# Patient Record
Sex: MALE | Race: BLACK OR AFRICAN AMERICAN | Employment: OTHER | URBAN - METROPOLITAN AREA
[De-identification: names, ages, dates, MRNs, and addresses within clinical notes are randomized per-mention and may not be internally consistent; named-entity substitution may affect disease eponyms.]

---

## 2017-09-03 ENCOUNTER — HOSPITAL ENCOUNTER (INPATIENT)
Age: 72
LOS: 4 days | Discharge: LEFT BEFORE DISCHARGE | DRG: 191 | End: 2017-09-08
Attending: EMERGENCY MEDICINE | Admitting: INTERNAL MEDICINE
Payer: MEDICARE

## 2017-09-03 ENCOUNTER — APPOINTMENT (OUTPATIENT)
Dept: GENERAL RADIOLOGY | Age: 72
DRG: 191 | End: 2017-09-03
Attending: EMERGENCY MEDICINE
Payer: MEDICARE

## 2017-09-03 DIAGNOSIS — Z72.0 TOBACCO ABUSE: ICD-10-CM

## 2017-09-03 DIAGNOSIS — R91.8 LUNG INFILTRATE: ICD-10-CM

## 2017-09-03 DIAGNOSIS — J44.1 ACUTE EXACERBATION OF CHRONIC OBSTRUCTIVE PULMONARY DISEASE (COPD) (HCC): Primary | ICD-10-CM

## 2017-09-03 LAB
ALBUMIN SERPL-MCNC: 3.3 G/DL (ref 3.4–5)
ALBUMIN/GLOB SERPL: 0.7 {RATIO} (ref 0.8–1.7)
ALP SERPL-CCNC: 125 U/L (ref 45–117)
ALT SERPL-CCNC: 28 U/L (ref 16–61)
ANION GAP SERPL CALC-SCNC: 6 MMOL/L (ref 3–18)
APTT PPP: 29.3 SEC (ref 23–36.4)
AST SERPL-CCNC: 27 U/L (ref 15–37)
BASOPHILS # BLD: 0 K/UL (ref 0–0.1)
BASOPHILS NFR BLD: 1 % (ref 0–2)
BILIRUB SERPL-MCNC: 0.5 MG/DL (ref 0.2–1)
BNP SERPL-MCNC: 158 PG/ML (ref 0–900)
BUN SERPL-MCNC: 8 MG/DL (ref 7–18)
BUN/CREAT SERPL: 11 (ref 12–20)
CALCIUM SERPL-MCNC: 9 MG/DL (ref 8.5–10.1)
CHLORIDE SERPL-SCNC: 104 MMOL/L (ref 100–108)
CK MB CFR SERPL CALC: 2.2 % (ref 0–4)
CK MB SERPL-MCNC: 1.8 NG/ML (ref 5–25)
CK SERPL-CCNC: 82 U/L (ref 39–308)
CO2 SERPL-SCNC: 28 MMOL/L (ref 21–32)
CREAT SERPL-MCNC: 0.74 MG/DL (ref 0.6–1.3)
DIFFERENTIAL METHOD BLD: ABNORMAL
EOSINOPHIL # BLD: 0.5 K/UL (ref 0–0.4)
EOSINOPHIL NFR BLD: 10 % (ref 0–5)
ERYTHROCYTE [DISTWIDTH] IN BLOOD BY AUTOMATED COUNT: 14 % (ref 11.6–14.5)
GLOBULIN SER CALC-MCNC: 4.5 G/DL (ref 2–4)
GLUCOSE SERPL-MCNC: 94 MG/DL (ref 74–99)
HCT VFR BLD AUTO: 41.5 % (ref 36–48)
HGB BLD-MCNC: 14.2 G/DL (ref 13–16)
INR PPP: 1 (ref 0.8–1.2)
LYMPHOCYTES # BLD: 2.6 K/UL (ref 0.9–3.6)
LYMPHOCYTES NFR BLD: 50 % (ref 21–52)
MAGNESIUM SERPL-MCNC: 2.3 MG/DL (ref 1.6–2.6)
MCH RBC QN AUTO: 28.6 PG (ref 24–34)
MCHC RBC AUTO-ENTMCNC: 34.2 G/DL (ref 31–37)
MCV RBC AUTO: 83.5 FL (ref 74–97)
MONOCYTES # BLD: 0.6 K/UL (ref 0.05–1.2)
MONOCYTES NFR BLD: 11 % (ref 3–10)
NEUTS SEG # BLD: 1.4 K/UL (ref 1.8–8)
NEUTS SEG NFR BLD: 28 % (ref 40–73)
PLATELET # BLD AUTO: 187 K/UL (ref 135–420)
PMV BLD AUTO: 9.3 FL (ref 9.2–11.8)
POTASSIUM SERPL-SCNC: 3.9 MMOL/L (ref 3.5–5.5)
PROT SERPL-MCNC: 7.8 G/DL (ref 6.4–8.2)
PROTHROMBIN TIME: 12.8 SEC (ref 11.5–15.2)
RBC # BLD AUTO: 4.97 M/UL (ref 4.7–5.5)
SODIUM SERPL-SCNC: 138 MMOL/L (ref 136–145)
TROPONIN I SERPL-MCNC: <0.02 NG/ML (ref 0–0.04)
WBC # BLD AUTO: 5.1 K/UL (ref 4.6–13.2)

## 2017-09-03 PROCEDURE — 80053 COMPREHEN METABOLIC PANEL: CPT | Performed by: EMERGENCY MEDICINE

## 2017-09-03 PROCEDURE — 85025 COMPLETE CBC W/AUTO DIFF WBC: CPT | Performed by: EMERGENCY MEDICINE

## 2017-09-03 PROCEDURE — 74011250636 HC RX REV CODE- 250/636: Performed by: EMERGENCY MEDICINE

## 2017-09-03 PROCEDURE — 74011000250 HC RX REV CODE- 250: Performed by: EMERGENCY MEDICINE

## 2017-09-03 PROCEDURE — 82550 ASSAY OF CK (CPK): CPT | Performed by: EMERGENCY MEDICINE

## 2017-09-03 PROCEDURE — 83880 ASSAY OF NATRIURETIC PEPTIDE: CPT | Performed by: EMERGENCY MEDICINE

## 2017-09-03 PROCEDURE — 96375 TX/PRO/DX INJ NEW DRUG ADDON: CPT

## 2017-09-03 PROCEDURE — 96361 HYDRATE IV INFUSION ADD-ON: CPT

## 2017-09-03 PROCEDURE — 77030029684 HC NEB SM VOL KT MONA -A

## 2017-09-03 PROCEDURE — 96365 THER/PROPH/DIAG IV INF INIT: CPT

## 2017-09-03 PROCEDURE — 85730 THROMBOPLASTIN TIME PARTIAL: CPT | Performed by: EMERGENCY MEDICINE

## 2017-09-03 PROCEDURE — 99285 EMERGENCY DEPT VISIT HI MDM: CPT

## 2017-09-03 PROCEDURE — 93005 ELECTROCARDIOGRAM TRACING: CPT

## 2017-09-03 PROCEDURE — 71010 XR CHEST PORT: CPT

## 2017-09-03 PROCEDURE — 83735 ASSAY OF MAGNESIUM: CPT | Performed by: EMERGENCY MEDICINE

## 2017-09-03 PROCEDURE — 85610 PROTHROMBIN TIME: CPT | Performed by: EMERGENCY MEDICINE

## 2017-09-03 RX ORDER — IPRATROPIUM BROMIDE AND ALBUTEROL SULFATE 2.5; .5 MG/3ML; MG/3ML
3 SOLUTION RESPIRATORY (INHALATION)
Status: COMPLETED | OUTPATIENT
Start: 2017-09-03 | End: 2017-09-03

## 2017-09-03 RX ORDER — MAGNESIUM SULFATE HEPTAHYDRATE 40 MG/ML
2 INJECTION, SOLUTION INTRAVENOUS
Status: COMPLETED | OUTPATIENT
Start: 2017-09-03 | End: 2017-09-04

## 2017-09-03 RX ADMIN — IPRATROPIUM BROMIDE AND ALBUTEROL SULFATE 3 ML: .5; 3 SOLUTION RESPIRATORY (INHALATION) at 22:30

## 2017-09-03 RX ADMIN — MAGNESIUM SULFATE IN WATER 2 G: 40 INJECTION, SOLUTION INTRAVENOUS at 23:45

## 2017-09-03 RX ADMIN — METHYLPREDNISOLONE SODIUM SUCCINATE 125 MG: 125 INJECTION, POWDER, FOR SOLUTION INTRAMUSCULAR; INTRAVENOUS at 23:30

## 2017-09-04 ENCOUNTER — APPOINTMENT (OUTPATIENT)
Dept: CT IMAGING | Age: 72
DRG: 191 | End: 2017-09-04
Attending: INTERNAL MEDICINE
Payer: MEDICARE

## 2017-09-04 PROBLEM — R63.4 WEIGHT LOSS: Status: ACTIVE | Noted: 2017-09-04

## 2017-09-04 PROBLEM — Z72.0 TOBACCO ABUSE: Status: ACTIVE | Noted: 2017-09-04

## 2017-09-04 PROBLEM — J44.1 COPD EXACERBATION (HCC): Status: ACTIVE | Noted: 2017-09-04

## 2017-09-04 PROBLEM — R91.8 LUNG INFILTRATE: Status: ACTIVE | Noted: 2017-09-04

## 2017-09-04 LAB
ATRIAL RATE: 100 BPM
CALCULATED P AXIS, ECG09: 80 DEGREES
CALCULATED R AXIS, ECG10: 77 DEGREES
CALCULATED T AXIS, ECG11: 75 DEGREES
DIAGNOSIS, 93000: NORMAL
P-R INTERVAL, ECG05: 162 MS
Q-T INTERVAL, ECG07: 352 MS
QRS DURATION, ECG06: 82 MS
QTC CALCULATION (BEZET), ECG08: 454 MS
VENTRICULAR RATE, ECG03: 100 BPM

## 2017-09-04 PROCEDURE — 74011250636 HC RX REV CODE- 250/636: Performed by: EMERGENCY MEDICINE

## 2017-09-04 PROCEDURE — 65270000029 HC RM PRIVATE

## 2017-09-04 PROCEDURE — 74011000258 HC RX REV CODE- 258: Performed by: INTERNAL MEDICINE

## 2017-09-04 PROCEDURE — 71260 CT THORAX DX C+: CPT

## 2017-09-04 PROCEDURE — 77010033678 HC OXYGEN DAILY

## 2017-09-04 PROCEDURE — 74011636320 HC RX REV CODE- 636/320: Performed by: INTERNAL MEDICINE

## 2017-09-04 PROCEDURE — 74011000258 HC RX REV CODE- 258: Performed by: EMERGENCY MEDICINE

## 2017-09-04 PROCEDURE — 93005 ELECTROCARDIOGRAM TRACING: CPT

## 2017-09-04 PROCEDURE — 74011250636 HC RX REV CODE- 250/636: Performed by: INTERNAL MEDICINE

## 2017-09-04 PROCEDURE — 74011000250 HC RX REV CODE- 250: Performed by: EMERGENCY MEDICINE

## 2017-09-04 PROCEDURE — 74011250637 HC RX REV CODE- 250/637: Performed by: INTERNAL MEDICINE

## 2017-09-04 PROCEDURE — 74011250637 HC RX REV CODE- 250/637: Performed by: EMERGENCY MEDICINE

## 2017-09-04 PROCEDURE — 74011000250 HC RX REV CODE- 250: Performed by: INTERNAL MEDICINE

## 2017-09-04 PROCEDURE — 74011250636 HC RX REV CODE- 250/636: Performed by: FAMILY MEDICINE

## 2017-09-04 RX ORDER — MORPHINE SULFATE 2 MG/ML
2 INJECTION, SOLUTION INTRAMUSCULAR; INTRAVENOUS
Status: DISCONTINUED | OUTPATIENT
Start: 2017-09-04 | End: 2017-09-08 | Stop reason: HOSPADM

## 2017-09-04 RX ORDER — CEFTRIAXONE 1 G/1
1 INJECTION, POWDER, FOR SOLUTION INTRAMUSCULAR; INTRAVENOUS
Status: DISCONTINUED | OUTPATIENT
Start: 2017-09-04 | End: 2017-09-04

## 2017-09-04 RX ORDER — SODIUM CHLORIDE 0.9 % (FLUSH) 0.9 %
5-10 SYRINGE (ML) INJECTION AS NEEDED
Status: DISCONTINUED | OUTPATIENT
Start: 2017-09-04 | End: 2017-09-08 | Stop reason: HOSPADM

## 2017-09-04 RX ORDER — ENOXAPARIN SODIUM 100 MG/ML
40 INJECTION SUBCUTANEOUS EVERY 24 HOURS
Status: DISCONTINUED | OUTPATIENT
Start: 2017-09-04 | End: 2017-09-08 | Stop reason: HOSPADM

## 2017-09-04 RX ORDER — IPRATROPIUM BROMIDE AND ALBUTEROL SULFATE 2.5; .5 MG/3ML; MG/3ML
3 SOLUTION RESPIRATORY (INHALATION)
Status: DISCONTINUED | OUTPATIENT
Start: 2017-09-04 | End: 2017-09-08 | Stop reason: HOSPADM

## 2017-09-04 RX ORDER — HYDRALAZINE HYDROCHLORIDE 20 MG/ML
10 INJECTION INTRAMUSCULAR; INTRAVENOUS
Status: DISCONTINUED | OUTPATIENT
Start: 2017-09-04 | End: 2017-09-08 | Stop reason: HOSPADM

## 2017-09-04 RX ORDER — AZITHROMYCIN 250 MG/1
500 TABLET, FILM COATED ORAL
Status: COMPLETED | OUTPATIENT
Start: 2017-09-04 | End: 2017-09-04

## 2017-09-04 RX ORDER — IPRATROPIUM BROMIDE AND ALBUTEROL SULFATE 2.5; .5 MG/3ML; MG/3ML
3 SOLUTION RESPIRATORY (INHALATION)
Status: COMPLETED | OUTPATIENT
Start: 2017-09-04 | End: 2017-09-04

## 2017-09-04 RX ORDER — HYDROCODONE POLISTIREX AND CHLORPHENIRAMINE POLISTIREX 10; 8 MG/5ML; MG/5ML
5 SUSPENSION, EXTENDED RELEASE ORAL
Status: DISCONTINUED | OUTPATIENT
Start: 2017-09-04 | End: 2017-09-08 | Stop reason: HOSPADM

## 2017-09-04 RX ORDER — SODIUM CHLORIDE 0.9 % (FLUSH) 0.9 %
5-10 SYRINGE (ML) INJECTION EVERY 8 HOURS
Status: DISCONTINUED | OUTPATIENT
Start: 2017-09-04 | End: 2017-09-08 | Stop reason: HOSPADM

## 2017-09-04 RX ORDER — GUAIFENESIN 600 MG/1
600 TABLET, EXTENDED RELEASE ORAL 2 TIMES DAILY
Status: DISCONTINUED | OUTPATIENT
Start: 2017-09-04 | End: 2017-09-08 | Stop reason: HOSPADM

## 2017-09-04 RX ADMIN — Medication 10 ML: at 05:48

## 2017-09-04 RX ADMIN — IPRATROPIUM BROMIDE AND ALBUTEROL SULFATE 3 ML: .5; 3 SOLUTION RESPIRATORY (INHALATION) at 02:30

## 2017-09-04 RX ADMIN — IPRATROPIUM BROMIDE AND ALBUTEROL SULFATE 3 ML: 2.5; .5 SOLUTION RESPIRATORY (INHALATION) at 16:00

## 2017-09-04 RX ADMIN — IPRATROPIUM BROMIDE AND ALBUTEROL SULFATE 3 ML: 2.5; .5 SOLUTION RESPIRATORY (INHALATION) at 11:56

## 2017-09-04 RX ADMIN — METHYLPREDNISOLONE SODIUM SUCCINATE 60 MG: 125 INJECTION, POWDER, FOR SOLUTION INTRAMUSCULAR; INTRAVENOUS at 06:02

## 2017-09-04 RX ADMIN — IOPAMIDOL 75 ML: 612 INJECTION, SOLUTION INTRAVENOUS at 17:00

## 2017-09-04 RX ADMIN — SODIUM CHLORIDE 500 MG: 900 INJECTION, SOLUTION INTRAVENOUS at 09:45

## 2017-09-04 RX ADMIN — GUAIFENESIN 600 MG: 600 TABLET, EXTENDED RELEASE ORAL at 09:55

## 2017-09-04 RX ADMIN — HYDRALAZINE HYDROCHLORIDE 10 MG: 20 INJECTION INTRAMUSCULAR; INTRAVENOUS at 19:15

## 2017-09-04 RX ADMIN — ENOXAPARIN SODIUM 40 MG: 40 INJECTION SUBCUTANEOUS at 06:51

## 2017-09-04 RX ADMIN — METHYLPREDNISOLONE SODIUM SUCCINATE 60 MG: 125 INJECTION, POWDER, FOR SOLUTION INTRAMUSCULAR; INTRAVENOUS at 17:15

## 2017-09-04 RX ADMIN — METHYLPREDNISOLONE SODIUM SUCCINATE 60 MG: 125 INJECTION, POWDER, FOR SOLUTION INTRAMUSCULAR; INTRAVENOUS at 11:59

## 2017-09-04 RX ADMIN — AZITHROMYCIN 500 MG: 250 TABLET, FILM COATED ORAL at 03:20

## 2017-09-04 RX ADMIN — Medication 5 ML: at 14:00

## 2017-09-04 RX ADMIN — SODIUM CHLORIDE 1000 ML: 900 INJECTION, SOLUTION INTRAVENOUS at 00:00

## 2017-09-04 RX ADMIN — Medication 10 ML: at 21:22

## 2017-09-04 RX ADMIN — IPRATROPIUM BROMIDE AND ALBUTEROL SULFATE 3 ML: 2.5; .5 SOLUTION RESPIRATORY (INHALATION) at 08:19

## 2017-09-04 RX ADMIN — CEFTRIAXONE 1 G: 1 INJECTION, POWDER, FOR SOLUTION INTRAMUSCULAR; INTRAVENOUS at 03:15

## 2017-09-04 RX ADMIN — GUAIFENESIN 600 MG: 600 TABLET, EXTENDED RELEASE ORAL at 17:16

## 2017-09-04 RX ADMIN — Medication 2 MG: at 21:22

## 2017-09-04 RX ADMIN — CEFTRIAXONE 1 G: 1 INJECTION, POWDER, FOR SOLUTION INTRAMUSCULAR; INTRAVENOUS at 06:01

## 2017-09-04 RX ADMIN — IPRATROPIUM BROMIDE AND ALBUTEROL SULFATE 3 ML: 2.5; .5 SOLUTION RESPIRATORY (INHALATION) at 23:43

## 2017-09-04 RX ADMIN — IPRATROPIUM BROMIDE AND ALBUTEROL SULFATE 3 ML: 2.5; .5 SOLUTION RESPIRATORY (INHALATION) at 19:44

## 2017-09-04 RX ADMIN — METHYLPREDNISOLONE SODIUM SUCCINATE 60 MG: 125 INJECTION, POWDER, FOR SOLUTION INTRAMUSCULAR; INTRAVENOUS at 23:43

## 2017-09-04 NOTE — ROUTINE PROCESS
Bedside and Verbal shift change report given to KOFI Rucker (oncoming nurse) by Elena Echols RN (offgoing nurse). Report included the following information SBAR, Kardex, MAR and Recent Results.     SITUATION:    Code Status: Full Code   Reason for Admission: COPD exacerbation ([facility] [address]   Lung infiltrate   Tobacco abuse    [facility] day: 0   Problem List:       Hospital Problems  Never Reviewed          Codes Class Noted POA    Tobacco abuse ICD-10-CM: Z72.0  ICD-9-CM: 305.1  9/4/2017 Yes        COPD exacerbation ([facility] [address] ICD-10-CM: J44.1  ICD-9-CM: 491.21  9/4/2017 Yes        Lung infiltrate ICD-10-CM: R91.8  ICD-9-CM: 793.19  9/4/2017 Yes              BACKGROUND:    Past Medical History:   Past Medical History:   Diagnosis Date    Arthritis     Asthma     COPD     Headache syndrome, complicated     Heroin abuse     Hyperlipidemia     Hypertension          Patient taking anticoagulants yes     ASSESSMENT:    Changes in Assessment Throughout Shift: SOB     Patient has Central Line: no Reasons if yes:    Patient has Chow Cath: no Reasons if yes:       Last Vitals:     Vitals:    09/04/17 0230 09/04/17 0557 09/04/17 0603 09/04/17 0606   BP: 130/81   137/88   Pulse: (!) 103   91   Resp: 15   24   Temp:    96.8 °F (36 °C)   SpO2: 95%   94%   Weight:  55.8 kg (123 lb)     Height:   5' 3\" (1.6 m)         IV and DRAINS (will only show if present)         WOUND (if present)   Wound Type:  none   Dressing present Dressing Present : No   Wound Concerns/Notes:  none     PAIN    Pain Assessment    Pain Intensity 1: 0 (09/04/17 0606)              Patient Stated Pain Goal: 0  o Interventions for Pain:  none  o Intervention effective:   o Time of last intervention:    o Reassessment Completed:       Last 3 Weights:  Last 3 Recorded Weights in this Encounter    09/04/17 0557   Weight: 55.8 kg (123 lb)     Weight change:      INTAKE/OUPUT    Current Shift:      Last three shifts:       LAB RESULTS     Recent Labs 09/03/17   2209   WBC  5.1   HGB  14.2   HCT  41.5   PLT  187        Recent Labs      09/03/17   2209   NA  138   K  3.9   GLU  94   BUN  8   CREA  0.74   CA  9.0   MG  2.3   INR  1.0       RECOMMENDATIONS AND DISCHARGE PLANNING     1. Pending tests/procedures/ Plan of Care or Other Needs: none     2. Discharge plan for patient and Needs/Barriers: home    3. Estimated Discharge Date: unknown Posted on Whiteboard in Patients Room: no      4. The patient's care plan was reviewed with the oncoming nurse. \"HEALS\" SAFETY CHECK      Fall Risk    Total Score: 3    Safety Measures: Safety Measures: Bed/Chair-Wheels locked, Bed in low position, Call light within reach, Side rails X 3    A safety check occurred in the patient's room between off going nurse and oncoming nurse listed above. The safety check included the below items  Area Items   H  High Alert Medications - Verify all high alert medication drips (heparin, PCA, etc.)   E  Equipment - Suction is set up for ALL patients (with vicenta)  - Red plugs utilized for all equipment (IV pumps, etc.)  - WOWs wiped down at end of shift.  - Room stocked with oxygen, suction, and other unit-specific supplies   A  Alarms - Bed alarm is set for fall risk patients  - Ensure chair alarm is in place and activated if patient is up in a chair   L  Lines - Check IV for any infiltration  - Chow bag is empty if patient has a Chow   - Tubing and IV bags are labeled   S  Safety   - Room is clean, patient is clean, and equipment is clean. - Hallways are clear from equipment besides carts. - Fall bracelet on for fall risk patients  - Ensure room is clear and free of clutter  - Suction is set up for ALL patients (with vicenta)  - Hallways are clear from equipment besides carts.    - Isolation precautions followed, supplies available outside room, sign posted     Ileana Reynolds RN

## 2017-09-04 NOTE — ROUTINE PROCESS
Bedside and Verbal shift change report given to ariel macias (oncoming nurse) by Gabo Schmidt RN (offgoing nurse). Report included the following information SBAR, Kardex, MAR and Recent Results.     SITUATION:    Code Status: Full Code   Reason for Admission: COPD exacerbation ([facility] [address]   Lung infiltrate   Tobacco abuse    [address] [facility] day: 0   Problem List:       Hospital Problems  Never Reviewed          Codes Class Noted POA    Tobacco abuse ICD-10-CM: Z72.0  ICD-9-CM: 305.1  9/4/2017 Yes        COPD exacerbation ([facility] [address] ICD-10-CM: J44.1  ICD-9-CM: 491.21  9/4/2017 Yes        Lung infiltrate ICD-10-CM: R91.8  ICD-9-CM: 793.19  9/4/2017 Yes        Weight loss ICD-10-CM: R63.4  ICD-9-CM: 783.21  9/4/2017 Yes              BACKGROUND:    Past Medical History:   Past Medical History:   Diagnosis Date    Arthritis     Asthma     COPD     Headache syndrome, complicated     Heroin abuse     Hyperlipidemia     Hypertension          Patient taking anticoagulants yes     ASSESSMENT:    Changes in Assessment Throughout Shift: no     Patient has Central Line: no Reasons if yes:    Patient has Chow Cath: no Reasons if yes:       Last Vitals:     Vitals:    09/04/17 0918 09/04/17 1258 09/04/17 1526 09/04/17 1915   BP: (!) 159/95 151/88 (!) 155/103 155/90   Pulse: 99 78 97    Resp: 20 19 19    Temp: 98.2 °F (36.8 °C) 97.6 °F (36.4 °C) 98.1 °F (36.7 °C)    SpO2: 96% 99% 96%    Weight:       Height:            IV and DRAINS (will only show if present)   Peripheral IV 09/04/17 Left Arm-Site Assessment: Clean, dry, & intact     WOUND (if present)   Wound Type:     Dressing present Dressing Present : No   Wound Concerns/Notes:  none     PAIN    Pain Assessment    Pain Intensity 1: 0 (09/04/17 0606)              Patient Stated Pain Goal: 0  o Interventions for Pain:    o Intervention effective  o Time of last intervention:    o Reassessment Completed: yes      Last 3 Weights:  Last 3 Recorded Weights in this Encounter 09/04/17 0557   Weight: 55.8 kg (123 lb)     Weight change:      INTAKE/OUPUT    Current Shift:      Last three shifts: 09/03 0701 - 09/04 1900  In: 0   Out: 150 [Urine:150]     LAB RESULTS     Recent Labs      09/03/17   2209   WBC  5.1   HGB  14.2   HCT  41.5   PLT  187        Recent Labs      09/03/17 2209   NA  138   K  3.9   GLU  94   BUN  8   CREA  0.74   CA  9.0   MG  2.3   INR  1.0       RECOMMENDATIONS AND DISCHARGE PLANNING     1. Pending tests/procedures/ Plan of Care or Other Needs: ct of chest     2. Discharge plan for patient and Needs/Barriers:     3. Estimated Discharge Date: 9/7/18 Posted on Whiteboard in [facility] Room: yes      4. The patient's care plan was reviewed with the oncoming nurse. \"HEALS\" SAFETY CHECK      Fall Risk    Total Score: 3    Safety Measures: Safety Measures: Bed/Chair-Wheels locked, Bed in low position, Call light within reach    A safety check occurred in the patient's room between off going nurse and oncoming nurse listed above. The safety check included the below items  Area Items   H  High Alert Medications - Verify all high alert medication drips (heparin, PCA, etc.)   E  Equipment - Suction is set up for ALL patients (with vicenta)  - Red plugs utilized for all equipment (IV pumps, etc.)  - WOWs wiped down at end of shift.  - Room stocked with oxygen, suction, and other unit-specific supplies   A  Alarms - Bed alarm is set for fall risk patients  - Ensure chair alarm is in place and activated if patient is up in a chair   L  Lines - Check IV for any infiltration  - Chow bag is empty if patient has a Chow   - Tubing and IV bags are labeled   S  Safety   - Room is clean, patient is clean, and equipment is clean. - Hallways are clear from equipment besides carts. - Fall bracelet on for fall risk patients  - Ensure room is clear and free of clutter  - Suction is set up for ALL patients (with vicenta)  - Hallways are clear from equipment besides carts. - Isolation precautions followed, supplies available outside room, sign posted     Santiago Ramos RN

## 2017-09-04 NOTE — PROGRESS NOTES
[facility]  [facility]    Patient: Sonia Larsen Age: 80 y.o. : 1945 MR#: [id number] SSN: [id number]  Date/Time: 2017 9:37 AM    Subjective/24-hour events:     Feeling better overall. Less short of breath, but continues to report some wheezing. Assessment:   Severe, bullous COPD with acute exacerbation  Unintentional weight loss  Tobacco abuse    Plan:  Continue solumedrol and scheduled nebs. Continue rocephin and azithromycin, but suspect that CXR appearance is more likely due to atelectasis than infiltrate. Monitor BPs. PRN medication ordered with parameters. Antitussive PRN. Nutrition eval.  Weight loss could be due to severe lung disease. Will gather more history before working up further. Mobilize as tolerated. Case discussed with:  [x]Patient  []Family  []Nursing  []Case Management  DVT Prophylaxis:  [x]Lovenox  []Hep SQ  []SCDs  []Coumadin   []On Heparin gtt    Objective:   VS:   Visit Vitals    BP (!) 159/95 (BP 1 Location: Left arm, BP Patient Position: At rest)    Pulse 99    Temp 98.2 °F (36.8 °C)    Resp 20    Ht 5' 3\" (1.6 m)    Wt 55.8 kg (123 lb)    SpO2 96%    BMI 21.79 kg/m2      Tmax/24hrs: Temp (24hrs), Av.8 °F (36.6 °C), Min:96.8 °F (36 °C), Max:98.3 °F (36.8 °C)    Intake/Output Summary (Last 24 hours) at 17 0937  Last data filed at 17 0705   Gross per 24 hour   Intake                0 ml   Output              150 ml   Net             -150 ml       General:  In NAD. Cardiovascular: RRR. Pulmonary:  Diffuse wheezes bilaterally, effort nonlabored. GI:  Abdomen soft, NTTP. Extremities:  Thin, warm. No edema.   Additional:  Awake and alert, motor nonfocal.    Labs:    Recent Results (from the past 24 hour(s))   CBC WITH AUTOMATED DIFF    Collection Time: 17 10:09 PM   Result Value Ref Range    WBC 5.1 4.6 - 13.2 K/uL    RBC 4.97 4.70 - 5.50 M/uL    HGB 14.2 13.0 - 16.0 g/dL    HCT 41.5 36.0 - 48.0 %    MCV 83.5 74.0 - 97.0 FL    MCH 28.6 24.0 - 34.0 PG    MCHC 34.2 31.0 - 37.0 g/dL    RDW 14.0 11.6 - 14.5 %    PLATELET 779 133 - 209 K/uL    MPV 9.3 9.2 - 11.8 FL    NEUTROPHILS 28 (L) 40 - 73 %    LYMPHOCYTES 50 21 - 52 %    MONOCYTES 11 (H) 3 - 10 %    EOSINOPHILS 10 (H) 0 - 5 %    BASOPHILS 1 0 - 2 %    ABS. NEUTROPHILS 1.4 (L) 1.8 - 8.0 K/UL    ABS. LYMPHOCYTES 2.6 0.9 - 3.6 K/UL    ABS. MONOCYTES 0.6 0.05 - 1.2 K/UL    ABS. EOSINOPHILS 0.5 (H) 0.0 - 0.4 K/UL    ABS. BASOPHILS 0.0 0.0 - 0.1 K/UL    DF AUTOMATED     METABOLIC PANEL, COMPREHENSIVE    Collection Time: 09/03/17 10:09 PM   Result Value Ref Range    Sodium 138 136 - 145 mmol/L    Potassium 3.9 3.5 - 5.5 mmol/L    Chloride 104 100 - 108 mmol/L    CO2 28 21 - 32 mmol/L    Anion gap 6 3.0 - 18 mmol/L    Glucose 94 74 - 99 mg/dL    BUN 8 7.0 - 18 MG/DL    Creatinine 0.74 0.6 - 1.3 MG/DL    BUN/Creatinine ratio 11 (L) 12 - 20      GFR est AA >60 >60 ml/min/1.73m2    GFR est non-AA >60 >60 ml/min/1.73m2    Calcium 9.0 8.5 - 10.1 MG/DL    Bilirubin, total 0.5 0.2 - 1.0 MG/DL    ALT (SGPT) 28 16 - 61 U/L    AST (SGOT) 27 15 - 37 U/L    Alk.  phosphatase 125 (H) 45 - 117 U/L    Protein, total 7.8 6.4 - 8.2 g/dL    Albumin 3.3 (L) 3.4 - 5.0 g/dL    Globulin 4.5 (H) 2.0 - 4.0 g/dL    A-G Ratio 0.7 (L) 0.8 - 1.7     MAGNESIUM    Collection Time: 09/03/17 10:09 PM   Result Value Ref Range    Magnesium 2.3 1.6 - 2.6 mg/dL   CARDIAC PANEL,(CK, CKMB & TROPONIN)    Collection Time: 09/03/17 10:09 PM   Result Value Ref Range    CK 82 39 - 308 U/L    CK - MB 1.8 <3.6 ng/ml    CK-MB Index 2.2 0.0 - 4.0 %    Troponin-I, Qt. <0.02 0.0 - 0.045 NG/ML   PROTHROMBIN TIME + INR    Collection Time: 09/03/17 10:09 PM   Result Value Ref Range    Prothrombin time 12.8 11.5 - 15.2 sec    INR 1.0 0.8 - 1.2     PTT    Collection Time: 09/03/17 10:09 PM   Result Value Ref Range    aPTT 29.3 23.0 - 36.4 SEC   NT-PRO BNP    Collection Time: 09/03/17 10:09 PM   Result Value Ref Range    NT pro- 0 - 900 PG/ML   EKG, 12 LEAD, INITIAL    Collection Time: 09/03/17 11:55 PM   Result Value Ref Range    Ventricular Rate 100 BPM    Atrial Rate 100 BPM    P-R Interval 162 ms    QRS Duration 82 ms    Q-T Interval 352 ms    QTC Calculation (Bezet) 454 ms    Calculated P Axis 80 degrees    Calculated R Axis 77 degrees    Calculated T Axis 75 degrees    Diagnosis       Normal sinus rhythm  Normal ECG  When compared with ECG of 09-SEP-2016 17:41,  Vent.  rate has increased BY  35 BPM  Criteria for Anteroseptal infarct are no longer present         Signed By: Brianne Mccormack MD     September 4, 2017 9:37 AM

## 2017-09-04 NOTE — ROUTINE PROCESS
Patient ambulatory with audable wheezing with exertion.  Patient assisted out of clothes to hospital gown , updated on ct scan to be done at lunch time

## 2017-09-04 NOTE — ED PROVIDER NOTES
HPI Comments: 10:29 PM Henrietta Paez is a 80 y.o. male with h/o Arthritis, HTN, and COPD who presents to ED complaining of SOB and wheezing onset 2 days ago. Patient also complains of a white and thick productive cough. He states that he has been out of his nebulizer and inhaler for 3 days. He was given a breathing treatment by EMS and started feeling only slightly better. Patient says he is not normally on O2 at home, but has it at home PRN. He last took steroids 3 months ago. Admits to continued smoking. Denies being on blood thinners, having blood clots, CP, and fever. No other concerns or symptoms at this time. PCP: Willian Baum MD3      The history is provided by the patient. Past Medical History:   Diagnosis Date    Arthritis     Asthma     COPD     Headache syndrome, complicated     Heroin abuse     Hyperlipidemia     Hypertension        Past Surgical History:   Procedure Laterality Date    HX APPENDECTOMY  hemorrhoid    HX CATARACT REMOVAL           Family History:   Problem Relation Age of Onset    Diabetes Mother     Hypertension Father        Social History     Social History    Marital status:      Spouse name: N/A    Number of children: N/A    Years of education: N/A     Occupational History    Not on file. Social History Main Topics    Smoking status: Current Every Day Smoker     Packs/day: 0.50    Smokeless tobacco: Not on file    Alcohol use Yes      Comment: occasional    Drug use: Yes     Special: Heroin      Comment: snort    Sexual activity: Not on file     Other Topics Concern    Not on file     Social History Narrative         ALLERGIES: Review of patient's allergies indicates no known allergies. Review of Systems   Constitutional: Negative. Negative for chills and fever. HENT: Negative. Negative for congestion. Eyes: Negative. Negative for visual disturbance. Respiratory: Positive for cough, shortness of breath and wheezing. Negative for chest tightness. Cardiovascular: Negative. Negative for chest pain and leg swelling. Gastrointestinal: Negative. Negative for abdominal pain, diarrhea, nausea and vomiting. Genitourinary: Negative. Negative for difficulty urinating and dysuria. Musculoskeletal: Negative. Negative for back pain and myalgias. Skin: Negative. Negative for rash and wound. Neurological: Negative. Negative for dizziness, speech difficulty, weakness and light-headedness. Psychiatric/Behavioral: Negative. Negative for self-injury. All other systems reviewed and are negative. Vitals:    09/03/17 2320   BP: (!) 148/100   Pulse: (!) 106   Resp: 20   Temp: 98.3 °F (36.8 °C)   SpO2: 100%            Physical Exam   Constitutional: He is oriented to person, place, and time. He appears well-developed and well-nourished. No distress. HENT:   Head: Normocephalic and atraumatic. Mouth/Throat: Mucous membranes are dry. Eyes: Conjunctivae and EOM are normal. Pupils are equal, round, and reactive to light. No scleral icterus. Neck: Normal range of motion. Neck supple. No JVD present. No thyromegaly present. Cardiovascular: Regular rhythm, S1 normal and S2 normal.  Tachycardia present. Exam reveals no gallop and no friction rub. No murmur heard. Pulmonary/Chest: Accessory muscle usage present. No respiratory distress. He has wheezes in the right upper field, the right middle field, the right lower field, the left upper field, the left middle field and the left lower field. Speaking full sentences. Abdominal: Soft. Normal appearance. He exhibits no distension. There is no tenderness. There is no rigidity, no rebound and no guarding. Musculoskeletal: Normal range of motion. He exhibits no edema or tenderness. Neurological: He is alert and oriented to person, place, and time. He has normal strength. No cranial nerve deficit or sensory deficit.  Coordination normal.   Skin: Skin is warm and intact. No rash noted. Psychiatric: He has a normal mood and affect. His speech is normal and behavior is normal.   Vitals reviewed. MDM  Number of Diagnoses or Management Options  Acute exacerbation of chronic obstructive pulmonary disease (COPD) ([address]):   Lung infiltrate:   Tobacco abuse:   Diagnosis management comments: Merrill Verde is a 80 y.o. Male coming in with COPD exacerbation. Low suspicion of PE. CXR shows ? ? Infiltrate and patient only marginally improved with nebs and steroids. Will treat to cover for CAP and will admit for further respiratory treatments. ED Course       Procedures  Vitals:  Patient Vitals for the past 12 hrs:   Temp Pulse Resp BP SpO2   09/03/17 2320 98.3 °F (36.8 °C) (!) 106 20 (!) 148/100 100 %       Medications ordered:   Medications   sodium chloride 0.9 % bolus infusion 1,000 mL (not administered)   methylPREDNISolone (PF) (SOLU-MEDROL) injection 125 mg (not administered)   magnesium sulfate 2 g/50 ml IVPB (premix or compounded) (not administered)   albuterol-ipratropium (DUO-NEB) 2.5 MG-0.5 MG/3 ML (not administered)   albuterol-ipratropium (DUO-NEB) 2.5 MG-0.5 MG/3 ML (not administered)   azithromycin (ZITHROMAX) tablet 500 mg (not administered)   cefTRIAXone (ROCEPHIN) injection 1 g (not administered)         Lab findings:  Recent Results (from the past 12 hour(s))   CBC WITH AUTOMATED DIFF    Collection Time: 09/03/17 10:09 PM   Result Value Ref Range    WBC 5.1 4.6 - 13.2 K/uL    RBC 4.97 4.70 - 5.50 M/uL    HGB 14.2 13.0 - 16.0 g/dL    HCT 41.5 36.0 - 48.0 %    MCV 83.5 74.0 - 97.0 FL    MCH 28.6 24.0 - 34.0 PG    MCHC 34.2 31.0 - 37.0 g/dL    RDW 14.0 11.6 - 14.5 %    PLATELET 404 645 - 095 K/uL    MPV 9.3 9.2 - 11.8 FL    NEUTROPHILS 28 (L) 40 - 73 %    LYMPHOCYTES 50 21 - 52 %    MONOCYTES 11 (H) 3 - 10 %    EOSINOPHILS 10 (H) 0 - 5 %    BASOPHILS 1 0 - 2 %    ABS. NEUTROPHILS 1.4 (L) 1.8 - 8.0 K/UL    ABS. LYMPHOCYTES 2.6 0.9 - 3.6 K/UL    ABS.  MONOCYTES 0.6 0.05 - 1.2 K/UL    ABS. EOSINOPHILS 0.5 (H) 0.0 - 0.4 K/UL    ABS. BASOPHILS 0.0 0.0 - 0.1 K/UL    DF AUTOMATED     METABOLIC PANEL, COMPREHENSIVE    Collection Time: 09/03/17 10:09 PM   Result Value Ref Range    Sodium 138 136 - 145 mmol/L    Potassium 3.9 3.5 - 5.5 mmol/L    Chloride 104 100 - 108 mmol/L    CO2 28 21 - 32 mmol/L    Anion gap 6 3.0 - 18 mmol/L    Glucose 94 74 - 99 mg/dL    BUN 8 7.0 - 18 MG/DL    Creatinine 0.74 0.6 - 1.3 MG/DL    BUN/Creatinine ratio 11 (L) 12 - 20      GFR est AA >60 >60 ml/min/1.73m2    GFR est non-AA >60 >60 ml/min/1.73m2    Calcium 9.0 8.5 - 10.1 MG/DL    Bilirubin, total 0.5 0.2 - 1.0 MG/DL    ALT (SGPT) 28 16 - 61 U/L    AST (SGOT) 27 15 - 37 U/L    Alk. phosphatase 125 (H) 45 - 117 U/L    Protein, total 7.8 6.4 - 8.2 g/dL    Albumin 3.3 (L) 3.4 - 5.0 g/dL    Globulin 4.5 (H) 2.0 - 4.0 g/dL    A-G Ratio 0.7 (L) 0.8 - 1.7     MAGNESIUM    Collection Time: 09/03/17 10:09 PM   Result Value Ref Range    Magnesium 2.3 1.6 - 2.6 mg/dL   CARDIAC PANEL,(CK, CKMB & TROPONIN)    Collection Time: 09/03/17 10:09 PM   Result Value Ref Range    CK 82 39 - 308 U/L    CK - MB 1.8 <3.6 ng/ml    CK-MB Index 2.2 0.0 - 4.0 %    Troponin-I, Qt. <0.02 0.0 - 0.045 NG/ML   PROTHROMBIN TIME + INR    Collection Time: 09/03/17 10:09 PM   Result Value Ref Range    Prothrombin time 12.8 11.5 - 15.2 sec    INR 1.0 0.8 - 1.2     PTT    Collection Time: 09/03/17 10:09 PM   Result Value Ref Range    aPTT 29.3 23.0 - 36.4 SEC   NT-PRO BNP    Collection Time: 09/03/17 10:09 PM   Result Value Ref Range    NT pro- 0 - 900 PG/ML       EKG interpretation by ED Physician:  12:00 AM Sinus rhythm at rate of 100. No ischemic changes. X-Ray, CT or other radiology findings or impressions:  XR CHEST PORT   Final Result         Progress notes, Consult notes, and Reevaluation of patient:   1:08 AM Consult: I discussed care with Dr. Janae Thomas.  It was a standard discussion including patient history, chief complaint, available diagnostic results, and predicted treatment course. Agrees to admit patient. Disposition:  Diagnosis:   1. Acute exacerbation of chronic obstructive pulmonary disease (COPD) ([facility] [address]    2. Tobacco abuse    3. Lung infiltrate        Disposition: Admit    Follow-up Information     None           Patient's Medications   Start Taking    No medications on file   Continue Taking    ALBUTEROL (PROVENTIL HFA, VENTOLIN HFA, PROAIR HFA) 90 MCG/ACTUATION INHALER    Take 1-2 Puffs by inhalation every four (4) hours as needed for Wheezing. FLUTICASONE-SALMETEROL (ADVAIR DISKUS) 250-50 MCG/DOSE DISKUS INHALER    Take 1 Puff by inhalation every twelve (12) hours. GUAIFENESIN (MUCINEX PO)    Take  by mouth. NEBULIZER & COMPRESSOR MACHINE    1 Each by Does Not Apply route daily as needed (wheezing). PREDNISONE (STERAPRED DS) 10 MG DOSE PACK    Use as directed, start on 9/10/16   These Medications have changed    No medications on file   Stop Taking    No medications on file     [facility] acting as a scribe for and in the presence of Andrey Waters MD      September 04, 2017 at 1:16 AM       Provider Attestation:      I personally performed the services described in the documentation, reviewed the documentation, as recorded by the scribe in my presence, and it accurately and completely records my words and actions.  September 04, 2017 at 1:16 AM - Andrey Waters MD

## 2017-09-04 NOTE — PROGRESS NOTES
NUTRITION    Nursing Referral: [facility]   Nutrition Consult: General Nutrition Management & Supplements      RECOMMENDATIONS / PLAN:     - Add supplement:  Ensure Enlive BID  - Continue RD inpatient monitoring and evaluation. NUTRITION INTERVENTIONS & DIAGNOSIS:     [x] Meals/Snacks: modified diet  [x] Medical food supplementation: add    Nutrition Diagnosis:  Unintentional weight loss related to inadequate energy intake as evidenced by wt loss of 17 lb x 11 months PTA    ASSESSMENT:     Pt lethargic at time of visit. Per chart review, pt reported decreased appetite and meal intake and unplanned wt loss PTA. Average po intake adequate to meet patients estimated nutritional needs:   [] Yes     [] No   [x] Unable to determine at this time    Diet: DIET REGULAR      Food Allergies:  None known   Current Appetite:   [] Good     [] Fair     [] Poor     [x] Other: unknown  Appetite/meal intake prior to admission:   [] Good     [] Fair     [x] Poor (per nursing screen)     [] Other:  Feeding Limitations:  [] Swallowing difficulty    [] Chewing difficulty    [] Other:  Current Meal Intake: No data found. BM:  9/3  Skin Integrity: no pressure ulcer or wound noted   Edema: none   Pertinent Medications: Reviewed    Recent Labs      09/03/17   2209   NA  138   K  3.9   CL  104   CO2  28   GLU  94   BUN  8   CREA  0.74   CA  9.0   MG  2.3   ALB  3.3*   SGOT  27   ALT  28       Intake/Output Summary (Last 24 hours) at 09/04/17 1402  Last data filed at 09/04/17 0705   Gross per 24 hour   Intake                0 ml   Output              150 ml   Net             -150 ml       Anthropometrics:  Ht Readings from Last 1 Encounters:   09/04/17 5' 3\" (1.6 m)     Last 3 Recorded Weights in this Encounter    09/04/17 0557   Weight: 55.8 kg (123 lb)     Body mass index is 21.79 kg/(m^2). Weight History:  Per nursing screen, pt reported unplanned wt loss PTA.  Per chart hx, noted 17 lb (12.1%) wt loss x 11 months PTA    Weight Metrics 9/4/2017 10/13/2016 9/9/2016 5/21/2015 5/3/2015 12/23/2014 12/2/2014   Weight 123 lb 140 lb 150 lb 145 lb 155 lb 138 lb 140 lb   BMI 21.79 kg/m2 24.8 kg/m2 26.57 kg/m2 25.69 kg/m2 27.46 kg/m2 24.45 kg/m2 24.81 kg/m2        Admitting Diagnosis: COPD exacerbation (HCC)  Lung infiltrate  Tobacco abuse  Pertinent PMHx: COPD, HLD, HTN    Education Needs:        [x] None identified  [] Identified - Not appropriate at this time  []  Identified and addressed - refer to education log  Learning Limitations:   [x] None identified  [] Identified    Cultural, Amish & ethnic food preferences:  [x] None identified    [] Identified and addressed     ESTIMATED NUTRITION NEEDS:     Calories: 9981-6003 kcal (30-35 kcal/kg) based on  [x] Actual BW: 56 kg      [] IBW   Protein: 63-74 gm (15% kcal) based on  [x] Actual BW      [] IBW   Fluid: 1 mL/kcal     MONITORING & EVALUATION:     Nutrition Goal(s):   1. Po intake of meals will meet >75% of patient estimated nutritional needs within the next 7 days.   Outcome:  [] Met/Ongoing    []  Not Met    [x] New/Initial Goal     Monitoring:   [x] Diet tolerance   [x] Meal intake   [x] Supplement intake   [] GI symptoms/ability to tolerate po diet   [] Respiratory status   [] Plan of care      Previous Recommendations (for follow-up assessments only):     []   Implemented       []   Not Implemented (RD to address)     [] No Recommendation Made     Discharge Planning:  Cardiac diet   [x] Participated in care planning, discharge planning, & interdisciplinary rounds as appropriate      Ellen Leija, [address] [place] [address]   Pager: [phone]

## 2017-09-04 NOTE — H&P
History & Physical    Patient: Peace Alonso MRN: [id number]  CSN: [id number]    YOB: 1945  Age: 80 y.o. Sex: male      DOA: 9/3/2017    Chief Complaint: No chief complaint on file. HPI:     Peace Alonso is a 80 y.o.  male who presented to ER with c/o progressive worsening of SOB x 3 days. Pt states he ran out of his own MDI and was using a friends to no avail. He is also a 30 yr smoker of 1/2 ppd. Upon arrival to ER, he was tachycardiac and hypertensive. CXR sig for \"Marked, advanced COPD. Marked chronic bullous emphysematous changes in right upper lobe and moderate bullous emphysematous changes in left upper lobe redemonstrated. Increased opacity in right lower lung field, indicating new atelectatic changes, with or without infiltrates, superimposed over chronic mild fibrotic process. Pre-existing mild to moderate fibrosis in left lower lung mainly in the lingula redemonstrated. \" Pt was started on empiric Abx as well as provided several neb tx, steroids and magnesium.      Past Medical History:   Diagnosis Date    Arthritis     Asthma     COPD     Headache syndrome, complicated     Heroin abuse     Hyperlipidemia     Hypertension        Past Surgical History:   Procedure Laterality Date    HX APPENDECTOMY  hemorrhoid    HX CATARACT REMOVAL         Family History   Problem Relation Age of Onset    Diabetes Mother     Hypertension Father        Social History     Social History    Marital status:      Spouse name: N/A    Number of children: N/A    Years of education: N/A     Social History Main Topics    Smoking status: Current Every Day Smoker     Packs/day: 0.50    Smokeless tobacco: Not on file    Alcohol use Yes      Comment: occasional    Drug use: Yes     Special: Heroin      Comment: snort    Sexual activity: Not on file     Other Topics Concern    Not on file     Social History Narrative       Prior to Admission medications    Medication Sig Start Date End Date Taking? Authorizing Provider   GUAIFENESIN (MUCINEX PO) Take  by mouth. Ella Bravo MD   albuterol (PROVENTIL HFA, VENTOLIN HFA, PROAIR HFA) 90 mcg/actuation inhaler Take 1-2 Puffs by inhalation every four (4) hours as needed for Wheezing. 10/13/16   Andra Shah MD   predniSONE [facility] DS) 10 mg dose pack Use as directed, start on 9/10/16 9/9/16   April FITO Meneses PA-C   fluticasone-salmeterol (ADVAIR DISKUS) 250-50 mcg/dose diskus inhaler Take 1 Puff by inhalation every twelve (12) hours. 5/22/15   ANIKET Ahumada   Nebulizer & Compressor machine 1 Each by Does Not Apply route daily as needed (wheezing). 5/22/15   ANIKET Ahumada       No Known Allergies      Review of Systems  GENERAL: Patient alert, awake and oriented times 3, able to communicate full sentences and not in distress. HEENT: No change in vision, no earache, tinnitus, sore throat or sinus congestion. NECK: No pain or stiffness. PULMONARY: +shortness of breath, cough and wheeze. Cardiovascular: no pnd or orthopnea, no CP  GASTROINTESTINAL: No abdominal pain, nausea, vomiting or diarrhea, melena or bright red blood per rectum. GENITOURINARY: No urinary frequency, urgency, hesitancy or dysuria. MUSCULOSKELETAL: No joint or muscle pain, no back pain, no recent trauma. DERMATOLOGIC: No rash, no itching, no lesions. ENDOCRINE: No polyuria, polydipsia, no heat or cold intolerance. No recent change in weight. HEMATOLOGICAL: No anemia or easy bruising or bleeding. NEUROLOGIC: No headache, seizures, numbness, tingling or weakness.        Physical Exam:     Physical Exam:  Visit Vitals    /88 (BP 1 Location: Right arm, BP Patient Position: At rest)    Pulse 91    Temp 96.8 °F (36 °C)    Resp 24    Ht 5' 3\" (1.6 m)    Wt 55.8 kg (123 lb)    SpO2 94%    BMI 21.79 kg/m2    O2 Flow Rate (L/min): 2 l/min O2 Device: Nasal cannula    Temp (24hrs), Av.6 °F (36.4 °C), Min:96.8 °F (36 °C), Max:98.3 °F (36.8 °C)    09/04 0701 - 09/04 1900  In: -   Out: 150 [Urine:150]        General:  Alert, cooperative, mild resp distress, appears stated age. Head: Normocephalic, without obvious abnormality, atraumatic. Eyes:  Conjunctivae/corneas clear. PERRL, EOMs intact. Nose: Nares normal. No drainage or sinus tenderness. Neck: Supple, symmetrical, trachea midline, no adenopathy, thyroid: no enlargement, no carotid bruit and no JVD. Lungs:   Diffuse, wheezing, decreased air entry bilaterally. + congestion. Heart:  Regular rate and rhythm, S1, S2 normal.     Abdomen: Soft, non-tender. Bowel sounds normal.    Extremities: Extremities normal, atraumatic, no cyanosis or edema. Pulses: 2+ and symmetric all extremities. Skin:  No rashes or lesions   Neurologic: AAOx3, No focal motor or sensory deficit. Labs Reviewed:    Lab results reviewed. For significant abnormal values and values requiring intervention, see assessment and plan. CXR and EKG    Procedures/imaging: see electronic medical records for all procedures/Xrays and details which were not copied into this note but were reviewed prior to creation of Plan      Assessment/Plan     Active Problems:    Tobacco abuse (9/4/2017)      COPD exacerbation ([facility] [address] (9/4/2017)      Lung infiltrate (9/4/2017)      Weight loss (9/4/2017)    Pt has responded to traditional tx for COPD exac. He describes sig weight loss in spite of a robust appetite. He denies hemoptysis or hematochezia/melena. In lieu of the CXR noting an increased opacity in right lower lung field, CT chest has been ordered. Cont neb tx, empiric Abx, steroids and mucolytics. Added acapella. Encouraged Pt to cease tobacco usage - described for 2 min ways in which he could accomplish that goal.     DVT/GI Prophylaxis: Lovenox    Discussed with patient at bedside about hospital admission and my plan care,he understood and agree with my plan care.     Gilbert Goody MD  9/4/2017 5:07 AM

## 2017-09-05 PROCEDURE — 74011000250 HC RX REV CODE- 250: Performed by: INTERNAL MEDICINE

## 2017-09-05 PROCEDURE — 74011250636 HC RX REV CODE- 250/636: Performed by: FAMILY MEDICINE

## 2017-09-05 PROCEDURE — 74011250637 HC RX REV CODE- 250/637: Performed by: FAMILY MEDICINE

## 2017-09-05 PROCEDURE — 94640 AIRWAY INHALATION TREATMENT: CPT

## 2017-09-05 PROCEDURE — 74011250637 HC RX REV CODE- 250/637: Performed by: INTERNAL MEDICINE

## 2017-09-05 PROCEDURE — 65270000029 HC RM PRIVATE

## 2017-09-05 PROCEDURE — 77010033678 HC OXYGEN DAILY

## 2017-09-05 PROCEDURE — 74011250636 HC RX REV CODE- 250/636: Performed by: INTERNAL MEDICINE

## 2017-09-05 PROCEDURE — 74011000258 HC RX REV CODE- 258: Performed by: INTERNAL MEDICINE

## 2017-09-05 RX ORDER — CLONIDINE HYDROCHLORIDE 0.1 MG/1
0.1 TABLET ORAL 2 TIMES DAILY
Status: DISCONTINUED | OUTPATIENT
Start: 2017-09-05 | End: 2017-09-08 | Stop reason: HOSPADM

## 2017-09-05 RX ORDER — HYDROCODONE BITARTRATE AND ACETAMINOPHEN 10; 325 MG/1; MG/1
1-2 TABLET ORAL
Status: DISCONTINUED | OUTPATIENT
Start: 2017-09-05 | End: 2017-09-08 | Stop reason: HOSPADM

## 2017-09-05 RX ADMIN — Medication 10 ML: at 05:55

## 2017-09-05 RX ADMIN — CLONIDINE HYDROCHLORIDE 0.1 MG: 0.1 TABLET ORAL at 17:07

## 2017-09-05 RX ADMIN — GUAIFENESIN 600 MG: 600 TABLET, EXTENDED RELEASE ORAL at 17:07

## 2017-09-05 RX ADMIN — IPRATROPIUM BROMIDE AND ALBUTEROL SULFATE 3 ML: 2.5; .5 SOLUTION RESPIRATORY (INHALATION) at 11:39

## 2017-09-05 RX ADMIN — IPRATROPIUM BROMIDE AND ALBUTEROL SULFATE 3 ML: 2.5; .5 SOLUTION RESPIRATORY (INHALATION) at 03:17

## 2017-09-05 RX ADMIN — METHYLPREDNISOLONE SODIUM SUCCINATE 60 MG: 125 INJECTION, POWDER, FOR SOLUTION INTRAMUSCULAR; INTRAVENOUS at 05:54

## 2017-09-05 RX ADMIN — IPRATROPIUM BROMIDE AND ALBUTEROL SULFATE 3 ML: 2.5; .5 SOLUTION RESPIRATORY (INHALATION) at 16:12

## 2017-09-05 RX ADMIN — Medication 10 ML: at 05:59

## 2017-09-05 RX ADMIN — Medication 2 MG: at 03:16

## 2017-09-05 RX ADMIN — GUAIFENESIN 600 MG: 600 TABLET, EXTENDED RELEASE ORAL at 09:18

## 2017-09-05 RX ADMIN — HYDRALAZINE HYDROCHLORIDE 10 MG: 20 INJECTION INTRAMUSCULAR; INTRAVENOUS at 03:16

## 2017-09-05 RX ADMIN — METHYLPREDNISOLONE SODIUM SUCCINATE 60 MG: 125 INJECTION, POWDER, FOR SOLUTION INTRAMUSCULAR; INTRAVENOUS at 12:55

## 2017-09-05 RX ADMIN — SODIUM CHLORIDE 500 MG: 900 INJECTION, SOLUTION INTRAVENOUS at 05:55

## 2017-09-05 RX ADMIN — Medication 10 ML: at 14:00

## 2017-09-05 RX ADMIN — IPRATROPIUM BROMIDE AND ALBUTEROL SULFATE 3 ML: 2.5; .5 SOLUTION RESPIRATORY (INHALATION) at 09:16

## 2017-09-05 RX ADMIN — CEFTRIAXONE 1 G: 1 INJECTION, POWDER, FOR SOLUTION INTRAMUSCULAR; INTRAVENOUS at 04:42

## 2017-09-05 RX ADMIN — CLONIDINE HYDROCHLORIDE 0.1 MG: 0.1 TABLET ORAL at 12:54

## 2017-09-05 RX ADMIN — ENOXAPARIN SODIUM 40 MG: 40 INJECTION SUBCUTANEOUS at 05:54

## 2017-09-05 RX ADMIN — HYDROCODONE BITARTRATE AND ACETAMINOPHEN 2 TABLET: 10; 325 TABLET ORAL at 16:19

## 2017-09-05 RX ADMIN — METHYLPREDNISOLONE SODIUM SUCCINATE 60 MG: 125 INJECTION, POWDER, FOR SOLUTION INTRAMUSCULAR; INTRAVENOUS at 17:07

## 2017-09-05 NOTE — PROGRESS NOTES
1162- No nausea or vomiting. Pain to rib cage well controlled with IV pain medication. Audible wheezing persists with periods of difficulty breathing. Hypertension treated with PRN meds, MD aware. Voiding  Yellow/straw colored urine in urinal at bedside.

## 2017-09-05 NOTE — PROGRESS NOTES
[facility] Hospitalist Group  Progress Note    Patient: Joyce Lopez Age: 80 y.o. : 1945 MR#: [id number] SSN: [id number]  Date/Time: 2017 10:47 AM    Subjective/24-hour events:     Reports having some increasing heroin withdrawal symptoms. Also had some worsening SOB/wheezing overnight but feeling significantly better this AM.    Assessment:   Severe, bullous COPD with acute exacerbation  Suspected acute bronchitis  Unintentional weight loss  Tobacco abuse  Heroin abuse    Plan:  Continue solumedrol and scheduled nebs. Continue rocephin and azithromycin for probable acute bronchitis. BPs up at times, possibly secondary to withdrawal. Will give low-dose clonidine for dual effect, monitor response. Nutrition supplements as recommended by RD. Encouraged to mobilize. Case discussed with:  [x]Patient  []Family  []Nursing  []Case Management  DVT Prophylaxis:  [x]Lovenox  []Hep SQ  []SCDs  []Coumadin   []On Heparin gtt    Objective:   VS:   Visit Vitals    BP (!) 144/91 (BP 1 Location: Right arm, BP Patient Position: At rest)    Pulse (!) 114    Temp 98 °F (36.7 °C)    Resp 20    Ht 5' 3\" (1.6 m)    Wt 55.8 kg (123 lb)    SpO2 98%    BMI 21.79 kg/m2      Tmax/24hrs: Temp (24hrs), Av.2 °F (36.8 °C), Min:97.6 °F (36.4 °C), Max:98.9 °F (37.2 °C)      Intake/Output Summary (Last 24 hours) at 17 1047  Last data filed at 17 0847   Gross per 24 hour   Intake              300 ml   Output              900 ml   Net             -600 ml       General:  In NAD. Cardiovascular: RRR. Pulmonary:  Diffuse wheezes bilaterally, effort nonlabored. GI:  Abdomen soft, NTTP. Extremities:  Thin, warm. No edema.   Additional:  Awake and alert, motor nonfocal.    Labs:    Recent Results (from the past 24 hour(s))   EKG, 12 LEAD, INITIAL    Collection Time: 17  9:06 PM   Result Value Ref Range    Ventricular Rate 138 BPM    Atrial Rate 138 BPM    P-R Interval 144 ms QRS Duration 76 ms    Q-T Interval 288 ms    QTC Calculation (Bezet) 436 ms    Calculated P Axis 95 degrees    Calculated R Axis 113 degrees    Calculated T Axis 115 degrees    Diagnosis       Suspect arm lead reversal, interpretation assumes no reversal  Sinus tachycardia  Anterolateral infarct , age undetermined  Abnormal ECG  When compared with ECG of 03-SEP-2017 23:55,  QRS axis shifted right  Anterior infarct is now present  Anterolateral infarct is now present  Non-specific change in ST segment in Lateral leads  T wave inversion now evident in Lateral leads         Signed By: Andrew Ruano MD     September 5, 2017 10:47 AM

## 2017-09-05 NOTE — PROGRESS NOTES
Problem: Falls - Risk of  Goal: *Absence of Falls  Document Blake Fall Risk and appropriate interventions in the flowsheet.    Outcome: Progressing Towards Goal  Fall Risk Interventions:  Mobility Interventions: Patient to call before getting OOB     Mentation Interventions: Adequate sleep, hydration, pain control, Door open when patient unattended     Medication Interventions: Patient to call before getting OOB     Elimination Interventions: Call light in reach     History of Falls Interventions: Door open when patient unattended

## 2017-09-05 NOTE — ROUTINE PROCESS
Patient ambulated to bathroom. Patient was short of breath and returned to chair. Patient had a BM in the floor because he could not make it to the restroom. Patients IV was also pulled out. Breathing treatment given upon return to bed. Patient stable.

## 2017-09-05 NOTE — CDMP QUERY
Please clarify if this patient is being treated/managed for:    =>Malnutrition - Mild or Moderate in setting of weight loss  =>Other Explanation of clinical findings  =>Unable to Determine (no explanation of clinical findings)    The medical record reflects the following:    Risk:copd    Clinical Indicators: PER RD \"  Unintentional weight loss related to inadequate energy intake as evidenced by wt loss of 17 lb x 11 months PTA\"    Treatment: Ensure  Enlive- BID    Please clarify and document your clinical opinion in the progress notes and discharge summary including the definitive and/or presumptive diagnosis, (suspected or probable), related to the above clinical findings. Please include clinical findings supporting your diagnosis. If you DECLINE this query or would like to communicate with [facility], please utilize the \"[employer] message box\" at the TOP of the Progress Note on the right.       Thank you,  Alphonso Barton RN  [facility] [phone]

## 2017-09-05 NOTE — ROUTINE PROCESS
Patient stable. Patient still short of breath when ambulating to restroom. Patient has non-productive cough. Patients pain controlled with pain medication. Patient now resting.

## 2017-09-05 NOTE — ROUTINE PROCESS
Bedside and Verbal shift change report given to RN (oncoming nurse) by Sabina Espinal RN (offgoing nurse). Report included the following information SBAR, Kardex, MAR and Recent Results.     SITUATION:    Code Status: Full Code   Reason for Admission: COPD exacerbation ([facility] [address]   Lung infiltrate   Tobacco abuse    [facility] day: 1   Problem List:       Hospital Problems  Never Reviewed          Codes Class Noted POA    Tobacco abuse ICD-10-CM: Z72.0  ICD-9-CM: 305.1  9/4/2017 Yes        COPD exacerbation ([facility] [address] ICD-10-CM: J44.1  ICD-9-CM: 491.21  9/4/2017 Yes        Lung infiltrate ICD-10-CM: R91.8  ICD-9-CM: 793.19  9/4/2017 Yes        Weight loss ICD-10-CM: R63.4  ICD-9-CM: 783.21  9/4/2017 Yes              BACKGROUND:    Past Medical History:   Past Medical History:   Diagnosis Date    Arthritis     Asthma     COPD     Headache syndrome, complicated     Heroin abuse     Hyperlipidemia     Hypertension          Patient taking anticoagulants yes     ASSESSMENT:    Changes in Assessment Throughout Shift: hypertension and tackycardia     Patient has Central Line: no Reasons if yes:    Patient has Chow Cath: no Reasons if yes:       Last Vitals:     Vitals:    09/04/17 1526 09/04/17 1915 09/04/17 2040 09/05/17 0311   BP: (!) 155/103 155/90 (!) 172/106 (!) 167/97   Pulse: 97  (!) 120 (!) 108   Resp: 19  25 20   Temp: 98.1 °F (36.7 °C)  98.2 °F (36.8 °C) 98.9 °F (37.2 °C)   SpO2: 96%  95% 97%   Weight:       Height:            IV and DRAINS (will only show if present)   Peripheral IV 09/04/17 Left Arm-Site Assessment: Clean, dry, & intact     WOUND (if present)   Wound Type:  none   Dressing present Dressing Present : No   Wound Concerns/Notes:  none     PAIN    Pain Assessment    Pain Intensity 1: 0 (09/05/17 0348)    Pain Location 1: Abdomen    Pain Intervention(s) 1: Medication (see MAR)    Patient Stated Pain Goal: 0  o Interventions for Pain: yes    o Intervention effective: yes  o Time of last intervention: See MAR   o Reassessment Completed: yes      Last 3 Weights:  Last 3 Recorded Weights in this Encounter    09/04/17 0557   Weight: 55.8 kg (123 lb)     Weight change:      INTAKE/OUPUT    Current Shift: 09/04 1901 - 09/05 0700  In: 300 [I.V.:300]  Out: 800 [Urine:800]    Last three shifts: 09/03 0701 - 09/04 1900  In: 0   Out: 150 [Urine:150]     LAB RESULTS     Recent Labs      09/03/17 2209   WBC  5.1   HGB  14.2   HCT  41.5   PLT  187        Recent Labs      09/03/17 2209   NA  138   K  3.9   GLU  94   BUN  8   CREA  0.74   CA  9.0   MG  2.3   INR  1.0       RECOMMENDATIONS AND DISCHARGE PLANNING     1. Pending tests/procedures/ Plan of Care or Other Needs: tbd     2. Discharge plan for patient and Needs/Barriers: tbd    3. Estimated Discharge Date: tbd Posted on Whiteboard in Patients Room: no       4. The patient's care plan was reviewed with the oncoming nurse. \"HEALS\" SAFETY CHECK      Fall Risk    Total Score: 3    Safety Measures: Safety Measures: Bed/Chair-Wheels locked, Bed in low position, Call light within reach    A safety check occurred in the patient's room between off going nurse and oncoming nurse listed above. The safety check included the below items  Area Items   H  High Alert Medications - Verify all high alert medication drips (heparin, PCA, etc.)   E  Equipment - Suction is set up for ALL patients (with yanker)  - Red plugs utilized for all equipment (IV pumps, etc.)  - WOWs wiped down at end of shift.  - Room stocked with oxygen, suction, and other unit-specific supplies   A  Alarms - Bed alarm is set for fall risk patients  - Ensure chair alarm is in place and activated if patient is up in a chair   L  Lines - Check IV for any infiltration  - Chow bag is empty if patient has a Chow   - Tubing and IV bags are labeled   S  Safety   - Room is clean, patient is clean, and equipment is clean. - Hallways are clear from equipment besides carts.    - Fall bracelet on for fall risk patients  - Ensure room is clear and free of clutter  - Suction is set up for ALL patients (with vicenta)  - Hallways are clear from equipment besides carts.    - Isolation precautions followed, supplies available outside room, sign posted     Mikki Martinez RN

## 2017-09-05 NOTE — ROUTINE PROCESS
Bedside and Verbal shift change report given to Sukhjinder Gage (oncoming nurse) by Bret Adames (offgoing nurse). Report included the following information SBAR, Kardex, MAR and Recent Results.     SITUATION:    Code Status: Full Code   Reason for Admission: COPD exacerbation ([facility] [address]   Lung infiltrate   Tobacco abuse    [facility] day: 1   Problem List:       Hospital Problems  Never Reviewed          Codes Class Noted POA    Tobacco abuse ICD-10-CM: Z72.0  ICD-9-CM: 305.1  9/4/2017 Yes        COPD exacerbation ([facility] [address] ICD-10-CM: J44.1  ICD-9-CM: 491.21  9/4/2017 Yes        Lung infiltrate ICD-10-CM: R91.8  ICD-9-CM: 793.19  9/4/2017 Yes        Weight loss ICD-10-CM: R63.4  ICD-9-CM: 783.21  9/4/2017 Yes              BACKGROUND:    Past Medical History:   Past Medical History:   Diagnosis Date    Arthritis     Asthma     COPD     Headache syndrome, complicated     Heroin abuse     Hyperlipidemia     Hypertension          Patient taking anticoagulants no     ASSESSMENT:    Changes in Assessment Throughout Shift: no     Patient has Central Line: no Reasons if yes:    Patient has Chow Cath: no Reasons if yes:       Last Vitals:     Vitals:    09/05/17 0755 09/05/17 1140 09/05/17 1157 09/05/17 1603   BP: (!) 144/91  121/77 134/84   Pulse: (!) 114  (!) 114 (!) 119   Resp: 20  20 20   Temp: 98 °F (36.7 °C)  98 °F (36.7 °C) 98 °F (36.7 °C)   SpO2: 98% 98% 95% 95%   Weight:       Height:            IV and DRAINS (will only show if present)   [REMOVED] Peripheral IV 09/04/17 Left Arm-Site Assessment: Clean, dry, & intact  Peripheral IV 09/05/17 Right-Site Assessment: Clean, dry, & intact     WOUND (if present)   Wound Type:  none   Dressing present Dressing Present : No   Wound Concerns/Notes:  none     PAIN    Pain Assessment    Pain Intensity 1: 0 (09/05/17 0348)    Pain Location 1: Abdomen    Pain Intervention(s) 1: Medication (see MAR)    Patient Stated Pain Goal: 0  o Interventions for Pain:  none, Norco  o Intervention effective: yes  o Time of last intervention: 1619  o Reassessment Completed: yes      Last 3 Weights:  Last 3 Recorded Weights in this Encounter    09/04/17 0557   Weight: 55.8 kg (123 lb)     Weight change:      INTAKE/OUPUT    Current Shift: 09/05 0701 - 09/05 1900  In: 480 [P.O.:480]  Out: 200 [Urine:200]    Last three shifts: 09/03 1901 - 09/05 0700  In: 300 [I.V.:300]  Out: 950 [Urine:950]     LAB RESULTS     Recent Labs      09/03/17 2209   WBC  5.1   HGB  14.2   HCT  41.5   PLT  187        Recent Labs      09/03/17 2209   NA  138   K  3.9   GLU  94   BUN  8   CREA  0.74   CA  9.0   MG  2.3   INR  1.0       RECOMMENDATIONS AND DISCHARGE PLANNING     1. Pending tests/procedures/ Plan of Care or Other Needs: labs     2. Discharge plan for patient and Needs/Barriers:     3. Estimated Discharge Date:  Posted on Whiteboard in Patients Room: no      4. The patient's care plan was reviewed with the oncoming nurse. \"HEALS\" SAFETY CHECK      Fall Risk    Total Score: 3    Safety Measures: Safety Measures: Bed/Chair-Wheels locked, Bed in low position, Call light within reach, Fall prevention (comment), Gripper socks, Side rails X 3    A safety check occurred in the patient's room between off going nurse and oncoming nurse listed above.     The safety check included the below items  Area Items   H  High Alert Medications - Verify all high alert medication drips (heparin, PCA, etc.)   E  Equipment - Suction is set up for ALL patients (with yanker)  - Red plugs utilized for all equipment (IV pumps, etc.)  - WOWs wiped down at end of shift.  - Room stocked with oxygen, suction, and other unit-specific supplies   A  Alarms - Bed alarm is set for fall risk patients  - Ensure chair alarm is in place and activated if patient is up in a chair   L  Lines - Check IV for any infiltration  - Chow bag is empty if patient has a Chow   - Tubing and IV bags are labeled   S  Safety   - Room is clean, patient is clean, and equipment is clean. - Hallways are clear from equipment besides carts. - Fall bracelet on for fall risk patients  - Ensure room is clear and free of clutter  - Suction is set up for ALL patients (with vicenta)  - Hallways are clear from equipment besides carts.    - Isolation precautions followed, supplies available outside room, sign posted     Callum Rose

## 2017-09-05 NOTE — PROGRESS NOTES
Notified hospitalist of patient hypertension and tachycardia, completed EKG and administered 2 mg morphine per MD order.

## 2017-09-05 NOTE — PROGRESS NOTES
Mr. Khloe Handy was asleep when [facility] and I stopped by, so we were unable to conduct a spiritual assessment. We will follow-up.      [address], M.Div, CPE Resident   Pager: [phone]  Phone: [phone]

## 2017-09-06 LAB
ANION GAP SERPL CALC-SCNC: 8 MMOL/L (ref 3–18)
ATRIAL RATE: 138 BPM
BASOPHILS # BLD: 0 K/UL (ref 0–0.1)
BASOPHILS NFR BLD: 0 % (ref 0–2)
BUN SERPL-MCNC: 27 MG/DL (ref 7–18)
BUN/CREAT SERPL: 33 (ref 12–20)
CALCIUM SERPL-MCNC: 9.5 MG/DL (ref 8.5–10.1)
CALCULATED P AXIS, ECG09: 95 DEGREES
CALCULATED R AXIS, ECG10: 113 DEGREES
CALCULATED T AXIS, ECG11: 115 DEGREES
CHLORIDE SERPL-SCNC: 102 MMOL/L (ref 100–108)
CO2 SERPL-SCNC: 26 MMOL/L (ref 21–32)
CREAT SERPL-MCNC: 0.81 MG/DL (ref 0.6–1.3)
DIAGNOSIS, 93000: NORMAL
DIFFERENTIAL METHOD BLD: ABNORMAL
EOSINOPHIL # BLD: 0 K/UL (ref 0–0.4)
EOSINOPHIL NFR BLD: 0 % (ref 0–5)
ERYTHROCYTE [DISTWIDTH] IN BLOOD BY AUTOMATED COUNT: 14.3 % (ref 11.6–14.5)
GLUCOSE SERPL-MCNC: 110 MG/DL (ref 74–99)
HCT VFR BLD AUTO: 44 % (ref 36–48)
HGB BLD-MCNC: 15.1 G/DL (ref 13–16)
LYMPHOCYTES # BLD: 1 K/UL (ref 0.9–3.6)
LYMPHOCYTES NFR BLD: 9 % (ref 21–52)
MCH RBC QN AUTO: 28.2 PG (ref 24–34)
MCHC RBC AUTO-ENTMCNC: 34.3 G/DL (ref 31–37)
MCV RBC AUTO: 82.2 FL (ref 74–97)
MONOCYTES # BLD: 0.4 K/UL (ref 0.05–1.2)
MONOCYTES NFR BLD: 4 % (ref 3–10)
NEUTS SEG # BLD: 9.8 K/UL (ref 1.8–8)
NEUTS SEG NFR BLD: 87 % (ref 40–73)
P-R INTERVAL, ECG05: 144 MS
PLATELET # BLD AUTO: 231 K/UL (ref 135–420)
PMV BLD AUTO: 10 FL (ref 9.2–11.8)
POTASSIUM SERPL-SCNC: 4.2 MMOL/L (ref 3.5–5.5)
Q-T INTERVAL, ECG07: 288 MS
QRS DURATION, ECG06: 76 MS
QTC CALCULATION (BEZET), ECG08: 436 MS
RBC # BLD AUTO: 5.35 M/UL (ref 4.7–5.5)
SODIUM SERPL-SCNC: 136 MMOL/L (ref 136–145)
VENTRICULAR RATE, ECG03: 138 BPM
WBC # BLD AUTO: 11.2 K/UL (ref 4.6–13.2)

## 2017-09-06 PROCEDURE — 74011250637 HC RX REV CODE- 250/637: Performed by: FAMILY MEDICINE

## 2017-09-06 PROCEDURE — 74011250636 HC RX REV CODE- 250/636: Performed by: INTERNAL MEDICINE

## 2017-09-06 PROCEDURE — 74011000250 HC RX REV CODE- 250: Performed by: INTERNAL MEDICINE

## 2017-09-06 PROCEDURE — 77010033678 HC OXYGEN DAILY: Performed by: INTERNAL MEDICINE

## 2017-09-06 PROCEDURE — 74011000258 HC RX REV CODE- 258: Performed by: INTERNAL MEDICINE

## 2017-09-06 PROCEDURE — 80048 BASIC METABOLIC PNL TOTAL CA: CPT | Performed by: FAMILY MEDICINE

## 2017-09-06 PROCEDURE — 36415 COLL VENOUS BLD VENIPUNCTURE: CPT | Performed by: FAMILY MEDICINE

## 2017-09-06 PROCEDURE — 74011250637 HC RX REV CODE- 250/637: Performed by: INTERNAL MEDICINE

## 2017-09-06 PROCEDURE — 85025 COMPLETE CBC W/AUTO DIFF WBC: CPT | Performed by: FAMILY MEDICINE

## 2017-09-06 PROCEDURE — 94640 AIRWAY INHALATION TREATMENT: CPT

## 2017-09-06 PROCEDURE — 65270000029 HC RM PRIVATE

## 2017-09-06 PROCEDURE — 74011250636 HC RX REV CODE- 250/636: Performed by: FAMILY MEDICINE

## 2017-09-06 RX ADMIN — Medication 10 ML: at 23:28

## 2017-09-06 RX ADMIN — HYDRALAZINE HYDROCHLORIDE 10 MG: 20 INJECTION INTRAMUSCULAR; INTRAVENOUS at 08:56

## 2017-09-06 RX ADMIN — CLONIDINE HYDROCHLORIDE 0.1 MG: 0.1 TABLET ORAL at 08:45

## 2017-09-06 RX ADMIN — METHYLPREDNISOLONE SODIUM SUCCINATE 60 MG: 125 INJECTION, POWDER, FOR SOLUTION INTRAMUSCULAR; INTRAVENOUS at 06:38

## 2017-09-06 RX ADMIN — IPRATROPIUM BROMIDE AND ALBUTEROL SULFATE 3 ML: 2.5; .5 SOLUTION RESPIRATORY (INHALATION) at 06:37

## 2017-09-06 RX ADMIN — IPRATROPIUM BROMIDE AND ALBUTEROL SULFATE 3 ML: 2.5; .5 SOLUTION RESPIRATORY (INHALATION) at 03:45

## 2017-09-06 RX ADMIN — METHYLPREDNISOLONE SODIUM SUCCINATE 60 MG: 125 INJECTION, POWDER, FOR SOLUTION INTRAMUSCULAR; INTRAVENOUS at 00:38

## 2017-09-06 RX ADMIN — IPRATROPIUM BROMIDE AND ALBUTEROL SULFATE 3 ML: 2.5; .5 SOLUTION RESPIRATORY (INHALATION) at 20:06

## 2017-09-06 RX ADMIN — GUAIFENESIN 600 MG: 600 TABLET, EXTENDED RELEASE ORAL at 17:00

## 2017-09-06 RX ADMIN — Medication 5 ML: at 14:00

## 2017-09-06 RX ADMIN — Medication 10 ML: at 06:38

## 2017-09-06 RX ADMIN — GUAIFENESIN 600 MG: 600 TABLET, EXTENDED RELEASE ORAL at 08:46

## 2017-09-06 RX ADMIN — Medication 2 MG: at 11:05

## 2017-09-06 RX ADMIN — CEFTRIAXONE 1 G: 1 INJECTION, POWDER, FOR SOLUTION INTRAMUSCULAR; INTRAVENOUS at 06:37

## 2017-09-06 RX ADMIN — METHYLPREDNISOLONE SODIUM SUCCINATE 60 MG: 125 INJECTION, POWDER, FOR SOLUTION INTRAMUSCULAR; INTRAVENOUS at 11:00

## 2017-09-06 RX ADMIN — HYDROCODONE BITARTRATE AND ACETAMINOPHEN 2 TABLET: 10; 325 TABLET ORAL at 08:45

## 2017-09-06 RX ADMIN — IPRATROPIUM BROMIDE AND ALBUTEROL SULFATE 3 ML: 2.5; .5 SOLUTION RESPIRATORY (INHALATION) at 14:08

## 2017-09-06 RX ADMIN — Medication 10 ML: at 00:38

## 2017-09-06 RX ADMIN — METHYLPREDNISOLONE SODIUM SUCCINATE 60 MG: 125 INJECTION, POWDER, FOR SOLUTION INTRAMUSCULAR; INTRAVENOUS at 23:28

## 2017-09-06 RX ADMIN — SODIUM CHLORIDE 500 MG: 900 INJECTION, SOLUTION INTRAVENOUS at 06:37

## 2017-09-06 RX ADMIN — METHYLPREDNISOLONE SODIUM SUCCINATE 60 MG: 125 INJECTION, POWDER, FOR SOLUTION INTRAMUSCULAR; INTRAVENOUS at 17:00

## 2017-09-06 RX ADMIN — IPRATROPIUM BROMIDE AND ALBUTEROL SULFATE 3 ML: 2.5; .5 SOLUTION RESPIRATORY (INHALATION) at 08:28

## 2017-09-06 RX ADMIN — CLONIDINE HYDROCHLORIDE 0.1 MG: 0.1 TABLET ORAL at 17:00

## 2017-09-06 RX ADMIN — HYDROCODONE BITARTRATE AND ACETAMINOPHEN 2 TABLET: 10; 325 TABLET ORAL at 16:59

## 2017-09-06 RX ADMIN — ENOXAPARIN SODIUM 40 MG: 40 INJECTION SUBCUTANEOUS at 06:38

## 2017-09-06 RX ADMIN — HYDRALAZINE HYDROCHLORIDE 10 MG: 20 INJECTION INTRAMUSCULAR; INTRAVENOUS at 22:38

## 2017-09-06 RX ADMIN — Medication 2 MG: at 00:38

## 2017-09-06 RX ADMIN — Medication 2 MG: at 20:06

## 2017-09-06 NOTE — PROGRESS NOTES
Bedside and Verbal shift change report given to Anai (oncoming nurse) by Noni Ramirez (offgoing nurse). Report included the following information SBAR, Kardex, MAR and Recent Results. SITUATION:  Code Status: Full Code  Reason for Admission: COPD exacerbation ([facility] [address]  Lung infiltrate  Tobacco abuse  Hospital day: 2  Problem List:       Hospital Problems  Never Reviewed          Codes Class Noted POA    Tobacco abuse ICD-10-CM: Z72.0  ICD-9-CM: 305.1  9/4/2017 Yes        COPD exacerbation ([facility] [address] ICD-10-CM: J44.1  ICD-9-CM: 491.21  9/4/2017 Yes        Lung infiltrate ICD-10-CM: R91.8  ICD-9-CM: 793.19  9/4/2017 Yes        Weight loss ICD-10-CM: R63.4  ICD-9-CM: 783.21  9/4/2017 Yes              BACKGROUND:   Past Medical History:   Past Medical History:   Diagnosis Date    Arthritis     Asthma     COPD     Headache syndrome, complicated     Heroin abuse     Hyperlipidemia     Hypertension       Patient taking anticoagulants yes    Patient has a defibrillator: no     ASSESSMENT:  Changes in Assessment Throughout Shift:   Patient alert and oriented x's 4. BP's have stabilized throughout the day. On 2 L O2, expiratory wheeze present, nebs given. Percocet given for pain. Eating well. Voiding adequately. No BM. Resting much of the day. Will continue to monitor.      Significant Changes in 24 hours (for example, RR/code, fall)  Patient has Central Line: no  Patient has Chow Cath: no  PT  IV Patency  OR Checklist  Pending Tests    Last Vitals:  Vitals w/ MEWS Score (last day)     Date/Time MEWS Score Pulse Resp Temp BP Level of Consciousness SpO2    09/06/17 1623 3 (!)  113 20 98.8 °F (37.1 °C) (!)  140/93 Alert 96 %    09/06/17 1127 3 (!)  114 20 98 °F (36.7 °C) 126/71 Alert 95 %    09/06/17 1000 -- -- -- -- (!)  156/94 -- --    09/06/17 0820 3 (!)  126 20 98.1 °F (36.7 °C) (!)  171/119 Alert 99 %    09/06/17 0419 2 (!)  107 20 98.2 °F (36.8 °C) (!)  155/108 Alert 96 %    09/06/17 0023 2 (!)  104 20 98.3 °F (36.8 °C) (!)  146/101 Alert 97 %    09/05/17 2028 3 (!)  113 20 98.9 °F (37.2 °C) 125/83 Alert 97 %    09/05/17 1603 3 (!)  119 20 98 °F (36.7 °C) 134/84 Alert 95 %    09/05/17 1157 3 (!)  114 20 98 °F (36.7 °C) 121/77 Alert 95 %    09/05/17 1140 -- -- -- -- -- -- 98 %    09/05/17 0755 3 (!)  114 20 98 °F (36.7 °C) (!)  144/91 Alert 98 %    09/05/17 0445 -- (!)  110 -- -- 124/71 -- --    09/05/17 0311 2 (!)  108 20 98.9 °F (37.2 °C) (!)  167/97 Alert 97 %            PAIN    Pain Assessment    Pain Intensity 1: 0 (09/06/17 0419)    Pain Location 1: Head    Pain Intervention(s) 1: Medication (see MAR)    Patient Stated Pain Goal: 0  Intervention effective: yes    Last 3 Weights:  Last 3 Recorded Weights in this Encounter    09/04/17 0557   Weight: 55.8 kg (123 lb)   Weight change:     INTAKE/OUPUT    Current Shift: 09/06 0701 - 09/06 1900  In: 250 [P.O.:250]  Out: 300 [Urine:300]    Last three shifts: 09/04 1901 - 09/06 0700  In: 780 [P.O.:480; I.V.:300]  Out: 1550 [Urine:1550]    RECOMMENDATIONS AND DISCHARGE PLANNING  Patient needs and requests: NA    Pending tests/procedures: NA     Discharge plan for patient: To be determined     Discharge planning Needs or Barriers: NA    Estimated Discharge Date: TO BE DETERMINED Posted on Whiteboard in Patients Room: no       \"HEALS\" SAFETY CHECK  A safety check occurred in the patient's room between off going nurse and oncoming nurse listed above. The safety check included the below items:    H  High Alert Medications Verify all high alert medication drips (heparin, PCA, etc.)  E  Equipment Suction is set up for ALL patients (with vicenta)  Red plugs utilized for all equipment (IV pumps, etc.)  WOWs wiped down at end of shift.   Room stocked with oxygen, suction, and other unit-specific supplies  A  Alarms Bed alarm is set for fall risk patients  Ensure chair alarm is in place and activated if patient is up in a chair  L  Lines Check IV for any infiltration  Chow bag is empty if patient has a Chow   Tubing and IV bags are labeled  S  Safety  Room is clean, patient is clean, and equipment is clean. Hallways are clear from equipment besides carts. Fall bracelet on for fall risk patients  Ensure room is clear and free of clutter  Suction is set up for ALL patients (with vicenta)  Hallways are clear from equipment besides carts.    Isolation precautions followed, supplies available outside room, sign posted    Michaela Gayle

## 2017-09-06 NOTE — ROUTINE PROCESS
Bedside and Verbal shift change report given to [facility], RN (oncoming nurse) by Nickola Krabbe, RN (offgoing nurse). Report included the following information SBAR, Kardex, MAR and Recent Results.     SITUATION:  Code Status: Full Code  Reason for Admission: COPD exacerbation ([facility] [address]  Lung infiltrate  Tobacco abuse  Hospital day: 2  Problem List:       Hospital Problems  Never Reviewed          Codes Class Noted POA    Tobacco abuse ICD-10-CM: Z72.0  ICD-9-CM: 305.1  9/4/2017 Yes        COPD exacerbation ([facility] [address] ICD-10-CM: J44.1  ICD-9-CM: 491.21  9/4/2017 Yes        Lung infiltrate ICD-10-CM: R91.8  ICD-9-CM: 793.19  9/4/2017 Yes        Weight loss ICD-10-CM: R63.4  ICD-9-CM: 783.21  9/4/2017 Yes              BACKGROUND:   Past Medical History:   Past Medical History:   Diagnosis Date    Arthritis     Asthma     COPD     Headache syndrome, complicated     Heroin abuse     Hyperlipidemia     Hypertension       Patient taking anticoagulants yes    Patient has a defibrillator: no    History of shots YES for example, flu, pneumonia, tetanus   Isolation History NO for example, MRSA, CDiff    ASSESSMENT:  Changes in Assessment Throughout Shift: None  Significant Changes in 24 hours (for example, RR/code, fall)  Patient has Central Line: no Reasons if yes: N/A  Patient has Chow Cath: no Reasons if yes: N/A   Mobility Issues  PT  IV Patency  OR Checklist  Pending Tests    Last Vitals:  Vitals w/ MEWS Score (last day)     Date/Time MEWS Score Pulse Resp Temp BP Level of Consciousness SpO2    09/06/17 0419 2 (!)  107 20 98.2 °F (36.8 °C) (!)  155/108 Alert 96 %    09/06/17 0023 2 (!)  104 20 98.3 °F (36.8 °C) (!)  146/101 Alert 97 %    09/05/17 2028 3 (!)  113 20 98.9 °F (37.2 °C) 125/83 Alert 97 %    09/05/17 1603 3 (!)  119 20 98 °F (36.7 °C) 134/84 Alert 95 %    09/05/17 1157 3 (!)  114 20 98 °F (36.7 °C) 121/77 Alert 95 %    09/05/17 1140 -- -- -- -- -- -- 98 %    09/05/17 0755 3 (!)  114 20 98 °F (36.7 °C) (!) 144/91 Alert 98 %    09/05/17 0445 -- (!)  110 -- -- 124/71 -- --    09/05/17 0311 2 (!)  108 20 98.9 °F (37.2 °C) (!)  167/97 Alert 97 %            PAIN    Pain Assessment    Pain Intensity 1: 0 (09/06/17 0419)    Pain Location 1: Head    Pain Intervention(s) 1: Medication (see MAR)    Patient Stated Pain Goal: 0  Intervention effective: yes  Time of last intervention: 0038 Reassessment Completed: yes   Other actions taken for pain: None    Last 3 Weights:  Last 3 Recorded Weights in this Encounter    09/04/17 0557   Weight: 55.8 kg (123 lb)   Weight change:     INTAKE/OUPUT    Current Shift: 09/05 1901 - 09/06 0700  In: -   Out: 250 [Urine:250]    Last three shifts: 09/04 0701 - 09/05 1900  In: 80 [P.O.:480; I.V.:300]  Out: 1150 [Urine:1150]    RECOMMENDATIONS AND DISCHARGE PLANNING  Patient needs and requests: Homeless, drug addict    Pending tests/procedures: None     Discharge plan for patient: TBD    Discharge planning Needs or Barriers: Homeless    Estimated Discharge Date: TBD Posted on Whiteboard in Patients Room: no       \"HEALS\" SAFETY CHECK  A safety check occurred in the patient's room between off going nurse and oncoming nurse listed above. The safety check included the below items:    H  High Alert Medications Verify all high alert medication drips (heparin, PCA, etc.)  E  Equipment Suction is set up for ALL patients (with yanker)  Red plugs utilized for all equipment (IV pumps, etc.)  WOWs wiped down at end of shift. Room stocked with oxygen, suction, and other unit-specific supplies  A  Alarms Bed alarm is set for fall risk patients  Ensure chair alarm is in place and activated if patient is up in a chair  L  Lines Check IV for any infiltration  Chow bag is empty if patient has a Chow   Tubing and IV bags are labeled  S  Safety  Room is clean, patient is clean, and equipment is clean. Hallways are clear from equipment besides carts.    Fall bracelet on for fall risk patients  Ensure room is clear and free of clutter  Suction is set up for ALL patients (with vicenta)  Hallways are clear from equipment besides carts.    Isolation precautions followed, supplies available outside room, sign posted    Dre Glez RN

## 2017-09-06 NOTE — NURSE NAVIGATOR
Name: Reba Medellin Age: 80 y.o.  Gender: male  Date: 9/6/2017    Date of Admission: 9/3/2017 10:01 PM    Admission Type: Emergency    Patient Active Problem List    Diagnosis Date Noted    Tobacco abuse 09/04/2017    COPD exacerbation ([address] 09/04/2017    Lung infiltrate 09/04/2017    Weight loss 09/04/2017     Luis E Gurrola MD    Pulmonologist:    Past Medical History:   Diagnosis Date    Arthritis     Asthma     COPD     Headache syndrome, complicated     Heroin abuse     Hyperlipidemia     Hypertension      Past Surgical History:   Procedure Laterality Date    HX APPENDECTOMY  hemorrhoid    HX CATARACT REMOVAL       Current Facility-Administered Medications   Medication Dose Route Frequency    cloNIDine HCl (CATAPRES) tablet 0.1 mg  0.1 mg Oral BID    HYDROcodone-acetaminophen (NORCO)  mg tablet 1-2 Tab  1-2 Tab Oral Q4H PRN    sodium chloride (NS) flush 5-10 mL  5-10 mL IntraVENous Q8H    sodium chloride (NS) flush 5-10 mL  5-10 mL IntraVENous PRN    methylPREDNISolone (PF) (SOLU-MEDROL) injection 60 mg  60 mg IntraVENous Q6H    azithromycin (ZITHROMAX) 500 mg in 0.9% sodium chloride (MBP/ADV) 250 mL adv  500 mg IntraVENous Q24H    cefTRIAXone (ROCEPHIN) 1 g in 0.9% sodium chloride (MBP/ADV) 50 mL MBP  1 g IntraVENous Q24H    morphine injection 2 mg  2 mg IntraVENous Q4H PRN    enoxaparin (LOVENOX) injection 40 mg  40 mg SubCUTAneous Q24H    albuterol-ipratropium (DUO-NEB) 2.5 MG-0.5 MG/3 ML  3 mL Nebulization Q4H RT    guaiFENesin ER (MUCINEX) tablet 600 mg  600 mg Oral BID    chlorpheniramine-HYDROcodone (TUSSIONEX) oral suspension 5 mL  5 mL Oral Q12H PRN    hydrALAZINE (APRESOLINE) 20 mg/mL injection 10 mg  10 mg IntraVENous Q6H PRN       Received Flu Vaccine for Current Season (usually Sept-March): Not Flu Season       Prior to admission patient presented with wheezing, cough & SOB          Today patient was observed lying bed resting    Current Oxygen therapy O2 Device: Nasal cannula O2 Sat (%): 95 % Resp Rate: 20    Labs-   Recent Results (from the past 24 hour(s))   METABOLIC PANEL, BASIC    Collection Time: 09/06/17  3:31 AM   Result Value Ref Range    Sodium 136 136 - 145 mmol/L    Potassium 4.2 3.5 - 5.5 mmol/L    Chloride 102 100 - 108 mmol/L    CO2 26 21 - 32 mmol/L    Anion gap 8 3.0 - 18 mmol/L    Glucose 110 (H) 74 - 99 mg/dL    BUN 27 (H) 7.0 - 18 MG/DL    Creatinine 0.81 0.6 - 1.3 MG/DL    BUN/Creatinine ratio 33 (H) 12 - 20      GFR est AA >60 >60 ml/min/1.73m2    GFR est non-AA >60 >60 ml/min/1.73m2    Calcium 9.5 8.5 - 10.1 MG/DL   CBC WITH AUTOMATED DIFF    Collection Time: 09/06/17  3:31 AM   Result Value Ref Range    WBC 11.2 4.6 - 13.2 K/uL    RBC 5.35 4.70 - 5.50 M/uL    HGB 15.1 13.0 - 16.0 g/dL    HCT 44.0 36.0 - 48.0 %    MCV 82.2 74.0 - 97.0 FL    MCH 28.2 24.0 - 34.0 PG    MCHC 34.3 31.0 - 37.0 g/dL    RDW 14.3 11.6 - 14.5 %    PLATELET 329 514 - 158 K/uL    MPV 10.0 9.2 - 11.8 FL    NEUTROPHILS 87 (H) 40 - 73 %    LYMPHOCYTES 9 (L) 21 - 52 %    MONOCYTES 4 3 - 10 %    EOSINOPHILS 0 0 - 5 %    BASOPHILS 0 0 - 2 %    ABS. NEUTROPHILS 9.8 (H) 1.8 - 8.0 K/UL    ABS. LYMPHOCYTES 1.0 0.9 - 3.6 K/UL    ABS. MONOCYTES 0.4 0.05 - 1.2 K/UL    ABS. EOSINOPHILS 0.0 0.0 - 0.4 K/UL    ABS. BASOPHILS 0.0 0.0 - 0.1 K/UL    DF AUTOMATED         No results found for: PO2, PCO2            Tests/Procedures resulted:  o Chest xray  o EKG  o Arterial Blood Gas  o Sputum culture    Recommendations:    Educational folder reviewed, discussed his heroin use, states \"it keeps my pressures down\". Will continue to follow up with patient.     Delvis Vences RN

## 2017-09-06 NOTE — PROGRESS NOTES
[facility] Hospitalist Group  Progress Note    Hospitalist Progress Note    NAME: Darylene Mura   :  1945   MRN:  [id number]     17  Assessment / Plan:  1. Acute exacerbation of COPD with noted severe bullous emphysema  2. Acute bronchitis  3. Heroin abuse with noted withdrawal  4. Tobacco abuse  5. Unintentional wt loss    1. Continue nebs and steroids as is for now  2. Continue to avoid narcotics and assist with withdrawal  Body mass index is 21.79 kg/(m^2). Code status: Full  Prophylaxis: Lovenox  Recommended Disposition: Home when ready     Subjective:     Chief Complaint / Reason for Physician Visit  \"sob\". Discussed with RN events overnight. Pt still shivering and having significant feeling of withdrawal. Wheezing but breathing better. Afebrile. Still with productive cough. Review of Systems:  Symptom Y/N Comments  Symptom Y/N Comments   Fever/Chills x   Chest Pain     Poor Appetite x   Edema     Cough x   Abdominal Pain     Sputum x   Joint Pain     SOB/CONRAD x   Pruritis/Rash     Nausea/vomit    Tolerating PT/OT     Diarrhea    Tolerating Diet     Constipation    Other       Could NOT obtain due to:      Objective:     VITALS:   Last 24hrs VS reviewed since prior progress note.  Most recent are:  Patient Vitals for the past 24 hrs:   Temp Pulse Resp BP SpO2   17 1127 98 °F (36.7 °C) (!) 114 20 126/71 95 %   17 1000 - - - (!) 156/94 -   17 0820 98.1 °F (36.7 °C) (!) 126 20 (!) 171/119 99 %   17 0419 98.2 °F (36.8 °C) (!) 107 20 (!) 155/108 96 %   17 0023 98.3 °F (36.8 °C) (!) 104 20 (!) 146/101 97 %   17 2028 98.9 °F (37.2 °C) (!) 113 20 125/83 97 %   17 1603 98 °F (36.7 °C) (!) 119 20 134/84 95 %       Intake/Output Summary (Last 24 hours) at 17 1513  Last data filed at 17 1103   Gross per 24 hour   Intake              150 ml   Output              950 ml   Net             -800 ml        PHYSICAL EXAM:  General: WD, WN. Alert, cooperative, in moderate acute distress    EENT:  EOMI. Anicteric sclerae. MMM  Resp:  CTA bilaterally, + wheezing no no rales. No accessory muscle use  CV:  Sinus tachycardia,  No edema  GI:  Soft, Non distended, Non tender.  +Bowel sounds  Neurologic:  Alert and oriented X 3, normal speech,   Psych:   Good insight. anxious   Skin:  No rashes. No jaundice    Reviewed most current lab test results and cultures  YES  Reviewed most current radiology test results   YES  Review and summation of old records today    NO  Reviewed patient's current orders and MAR    YES  PMH/SH reviewed - no change compared to H&P  ________________________________________________________________________  Care Plan discussed with:    Comments   Patient x    Family      RN     Care Manager     Consultant                        Multidiciplinary team rounds were held today with , nursing, pharmacist and clinical coordinator. Patient's plan of care was discussed; medications were reviewed and discharge planning was addressed. ________________________________________________________________________  Total NON critical care TIME:  20   Minutes    Total CRITICAL CARE TIME Spent:   Minutes non procedure based      Comments   >50% of visit spent in counseling and coordination of care     ________________________________________________________________________  Neda Sloan MD     Procedures: see electronic medical records for all procedures/Xrays and details which were not copied into this note but were reviewed prior to creation of Plan. LABS:  I reviewed today's most current labs and imaging studies.   Pertinent labs include:  Recent Labs      09/06/17   0331  09/03/17   2209   WBC  11.2  5.1   HGB  15.1  14.2   HCT  44.0  41.5   PLT  231  187     Recent Labs      09/06/17   0331  09/03/17 2209   NA  136  138   K  4.2  3.9   CL  102  104   CO2  26  28   GLU  110*  94   BUN  27*  8   CREA 0. 81  0.74   CA  9.5  9.0   MG   --   2.3   ALB   --   3.3*   TBILI   --   0.5   SGOT   --   27   ALT   --   28   INR   --   1.0       Signed: Elizabeth Pan MD

## 2017-09-06 NOTE — INTERDISCIPLINARY ROUNDS
IDR/SLIDR Summary          Patient: Reba Medellin MRN: [id number]    Age: 80 y.o. YOB: 1945 Room/Bed: [facility]   Admit Diagnosis: COPD exacerbation (HCC)  Lung infiltrate  Tobacco abuse  Principal Diagnosis: <principal problem not specified>   Goals: oxygenation  Readmission: NO  Quality Measure: COPD  VTE Prophylaxis: Chemical  Influenza Vaccine screening completed? NO  Pneumococcal Vaccine screening completed? NO  Mobility needs: No   Nutrition plan:Yes  Consults:Respiratory    Financial concerns:Yes  Escalated to CM? YES  RRAT Score: 9   Interventions:  Testing due for pt today?  NO  LOS: 2 days Expected length of stay 4 days  Discharge plan: TBD   PCP: Luis E Gurrola MD  Transportation needs: Yes    Days before discharge:one day until discharge   Discharge disposition: TBD    Signed:     Sammy Freedman RN  9/6/2017  4:32 AM

## 2017-09-07 PROCEDURE — 74011000250 HC RX REV CODE- 250: Performed by: INTERNAL MEDICINE

## 2017-09-07 PROCEDURE — 74011250637 HC RX REV CODE- 250/637: Performed by: FAMILY MEDICINE

## 2017-09-07 PROCEDURE — 94640 AIRWAY INHALATION TREATMENT: CPT

## 2017-09-07 PROCEDURE — 74011000258 HC RX REV CODE- 258: Performed by: INTERNAL MEDICINE

## 2017-09-07 PROCEDURE — 65270000029 HC RM PRIVATE

## 2017-09-07 PROCEDURE — 74011250636 HC RX REV CODE- 250/636: Performed by: INTERNAL MEDICINE

## 2017-09-07 PROCEDURE — 74011250637 HC RX REV CODE- 250/637: Performed by: INTERNAL MEDICINE

## 2017-09-07 PROCEDURE — 77010033678 HC OXYGEN DAILY

## 2017-09-07 RX ADMIN — IPRATROPIUM BROMIDE AND ALBUTEROL SULFATE 3 ML: 2.5; .5 SOLUTION RESPIRATORY (INHALATION) at 21:45

## 2017-09-07 RX ADMIN — CLONIDINE HYDROCHLORIDE 0.1 MG: 0.1 TABLET ORAL at 17:46

## 2017-09-07 RX ADMIN — Medication 10 ML: at 06:01

## 2017-09-07 RX ADMIN — IPRATROPIUM BROMIDE AND ALBUTEROL SULFATE 3 ML: 2.5; .5 SOLUTION RESPIRATORY (INHALATION) at 00:18

## 2017-09-07 RX ADMIN — GUAIFENESIN 600 MG: 600 TABLET, EXTENDED RELEASE ORAL at 17:46

## 2017-09-07 RX ADMIN — Medication 2 MG: at 05:47

## 2017-09-07 RX ADMIN — CEFTRIAXONE 1 G: 1 INJECTION, POWDER, FOR SOLUTION INTRAMUSCULAR; INTRAVENOUS at 05:47

## 2017-09-07 RX ADMIN — Medication 5 ML: at 14:00

## 2017-09-07 RX ADMIN — IPRATROPIUM BROMIDE AND ALBUTEROL SULFATE 3 ML: 2.5; .5 SOLUTION RESPIRATORY (INHALATION) at 12:17

## 2017-09-07 RX ADMIN — IPRATROPIUM BROMIDE AND ALBUTEROL SULFATE 3 ML: 2.5; .5 SOLUTION RESPIRATORY (INHALATION) at 08:33

## 2017-09-07 RX ADMIN — METHYLPREDNISOLONE SODIUM SUCCINATE 60 MG: 125 INJECTION, POWDER, FOR SOLUTION INTRAMUSCULAR; INTRAVENOUS at 21:25

## 2017-09-07 RX ADMIN — HYDROCODONE BITARTRATE AND ACETAMINOPHEN 1 TABLET: 10; 325 TABLET ORAL at 08:24

## 2017-09-07 RX ADMIN — ENOXAPARIN SODIUM 40 MG: 40 INJECTION SUBCUTANEOUS at 05:47

## 2017-09-07 RX ADMIN — SODIUM CHLORIDE 500 MG: 900 INJECTION, SOLUTION INTRAVENOUS at 07:09

## 2017-09-07 RX ADMIN — CLONIDINE HYDROCHLORIDE 0.1 MG: 0.1 TABLET ORAL at 08:24

## 2017-09-07 RX ADMIN — IPRATROPIUM BROMIDE AND ALBUTEROL SULFATE 3 ML: 2.5; .5 SOLUTION RESPIRATORY (INHALATION) at 16:28

## 2017-09-07 RX ADMIN — METHYLPREDNISOLONE SODIUM SUCCINATE 60 MG: 125 INJECTION, POWDER, FOR SOLUTION INTRAMUSCULAR; INTRAVENOUS at 05:47

## 2017-09-07 RX ADMIN — Medication 10 ML: at 21:25

## 2017-09-07 RX ADMIN — HYDROCODONE BITARTRATE AND ACETAMINOPHEN 2 TABLET: 10; 325 TABLET ORAL at 13:27

## 2017-09-07 RX ADMIN — HYDROCODONE BITARTRATE AND ACETAMINOPHEN 2 TABLET: 10; 325 TABLET ORAL at 17:48

## 2017-09-07 RX ADMIN — GUAIFENESIN 600 MG: 600 TABLET, EXTENDED RELEASE ORAL at 08:24

## 2017-09-07 NOTE — PROGRESS NOTES
Bedside and Verbal shift change report given to Tejal Mata (oncoming nurse) by Marc Vargas RN (offgoing nurse). Report included the following information SBAR, Kardex, MAR and Recent Results. SITUATION:  Code Status: Full Code  Reason for Admission: COPD exacerbation ([facility] [address]  Lung infiltrate  Tobacco abuse  Hospital day: 3  Problem List:       Hospital Problems  Never Reviewed          Codes Class Noted POA    Tobacco abuse ICD-10-CM: Z72.0  ICD-9-CM: 305.1  9/4/2017 Yes        COPD exacerbation ([facility] [address] ICD-10-CM: J44.1  ICD-9-CM: 491.21  9/4/2017 Yes        Lung infiltrate ICD-10-CM: R91.8  ICD-9-CM: 793.19  9/4/2017 Yes        Weight loss ICD-10-CM: R63.4  ICD-9-CM: 783.21  9/4/2017 Yes              BACKGROUND:   Past Medical History:   Past Medical History:   Diagnosis Date    Arthritis     Asthma     COPD     Headache syndrome, complicated     Heroin abuse     Hyperlipidemia     Hypertension       Patient taking anticoagulants yes    Patient has a defibrillator: no     ASSESSMENT:  Changes in Assessment Throughout Shift:   Patient alert and oriented x's 4. BP's have stabilized throughout the day. On 2 L O2, expiratory wheeze present, nebs given. Percocet given for pain. Eating well. Voiding adequately. No BM. Resting much of the day. Will continue to monitor.      Significant Changes in 24 hours (for example, RR/code, fall)  Patient has Central Line: no  Patient has Chow Cath: no  PT  IV Patency  OR Checklist  Pending Tests    Last Vitals:  Vitals w/ MEWS Score (last day)     Date/Time MEWS Score Pulse Resp Temp BP Level of Consciousness SpO2    09/07/17 0019 -- -- -- -- -- -- 98 %    09/07/17 0000 -- 96 21 98 °F (36.7 °C) 143/88 -- 93 %    09/06/17 2000 2 98 22 98 °F (36.7 °C) (!)  148/94 Alert 93 %    09/06/17 1623 3 (!)  113 20 98.8 °F (37.1 °C) (!)  140/93 Alert 96 %    09/06/17 1127 3 (!)  114 20 98 °F (36.7 °C) 126/71 Alert 95 %    09/06/17 1000 -- -- -- -- (!)  156/94 -- --    09/06/17 0820 3 (!)  126 20 98.1 °F (36.7 °C) (!)  171/119 Alert 99 %    09/06/17 0419 2 (!)  107 20 98.2 °F (36.8 °C) (!)  155/108 Alert 96 %    09/06/17 0023 2 (!)  104 20 98.3 °F (36.8 °C) (!)  146/101 Alert 97 %            PAIN    Pain Assessment    Pain Intensity 1: 9 (09/06/17 2005)    Pain Location 1: Generalized    Pain Intervention(s) 1: Medication (see MAR)    Patient Stated Pain Goal: 0  Intervention effective: yes    Last 3 Weights:  Last 3 Recorded Weights in this Encounter    09/04/17 0557   Weight: 55.8 kg (123 lb)   Weight change:     INTAKE/OUPUT    Current Shift: 09/06 1901 - 09/07 0700  In: -   Out: 400 [Urine:400]    Last three shifts: 09/05 0701 - 09/06 1900  In: 730 [P.O.:730]  Out: 1050 [Urine:1050]    RECOMMENDATIONS AND DISCHARGE PLANNING  Patient needs and requests: NA    Pending tests/procedures: NA     Discharge plan for patient: To be determined     Discharge planning Needs or Barriers: NA    Estimated Discharge Date: TO BE DETERMINED Posted on Whiteboard in Patients Room: no       \"HEALS\" SAFETY CHECK  A safety check occurred in the patient's room between off going nurse and oncoming nurse listed above. The safety check included the below items:    H  High Alert Medications Verify all high alert medication drips (heparin, PCA, etc.)  E  Equipment Suction is set up for ALL patients (with finaker)  Red plugs utilized for all equipment (IV pumps, etc.)  WOWs wiped down at end of shift. Room stocked with oxygen, suction, and other unit-specific supplies  A  Alarms Bed alarm is set for fall risk patients  Ensure chair alarm is in place and activated if patient is up in a chair  L  Lines Check IV for any infiltration  Chow bag is empty if patient has a Chow   Tubing and IV bags are labeled  S  Safety  Room is clean, patient is clean, and equipment is clean. Hallways are clear from equipment besides carts.    Fall bracelet on for fall risk patients  Ensure room is clear and free of clutter  Suction is set up for ALL patients (with vicenta)  Hallways are clear from equipment besides carts.    Isolation precautions followed, supplies available outside room, sign posted    Helder Everett, KOFI

## 2017-09-07 NOTE — PROGRESS NOTES
Problem: Falls - Risk of  Goal: *Absence of Falls  Document Blake Fall Risk and appropriate interventions in the flowsheet.    Outcome: Progressing Towards Goal  Fall Risk Interventions:  Mobility Interventions: Bed/chair exit alarm     Mentation Interventions: Bed/chair exit alarm     Medication Interventions: Bed/chair exit alarm     Elimination Interventions: Call light in reach     History of Falls Interventions: Bed/chair exit alarm

## 2017-09-07 NOTE — PROGRESS NOTES
[facility]ist [facility]  Progress Note    Hospitalist Progress Note    NAME: Geo Jackson   :  1945   MRN:  [id number]     17  Assessment / Plan:  1. Acute exacerbation of COPD with noted severe bullous emphysema  2. Acute bronchitis  3. Heroin abuse with noted withdrawal  4. Tobacco abuse  5. Unintentional wt loss    1. Continue nebs and begin wean of steroids - decreased to 60 q12  2. Continue to avoid narcotics and assist with withdrawal  3. Can likely start po taper of prednisone soon with plan to dc thereafter. Will benefit from methadone clinic appointment, if pt willing to utilize their services for heroin detox. Body mass index is 21.79 kg/(m^2). Code status: Full  Prophylaxis: Lovenox  Recommended Disposition: Home when ready     Subjective:     Chief Complaint / Reason for Physician Visit  \"sob\". Discussed with RN events overnight. Appears much more steady today. Less shivering. Sitting on eob eating food without difficulty. Review of Systems:  Symptom Y/N Comments  Symptom Y/N Comments   Fever/Chills    Chest Pain     Poor Appetite    Edema     Cough x   Abdominal Pain     Sputum x   Joint Pain     SOB/CONRAD x  less  Pruritis/Rash     Nausea/vomit    Tolerating PT/OT     Diarrhea    Tolerating Diet     Constipation    Other       Could NOT obtain due to:      Objective:     VITALS:   Last 24hrs VS reviewed since prior progress note.  Most recent are:  Patient Vitals for the past 24 hrs:   Temp Pulse Resp BP SpO2   17 1630 - - - - 97 %   17 1555 97.6 °F (36.4 °C) (!) 119 20 136/87 90 %   17 1345 - - - - 93 %   17 1218 - - - - 93 %   17 1145 97.3 °F (36.3 °C) 95 19 126/62 95 %   17 0834 - - - - 97 %   17 0751 97.5 °F (36.4 °C) 94 20 137/85 94 %   17 0400 97.8 °F (36.6 °C) 92 22 122/78 93 %   17 0019 - - - - 98 %   17 0000 98 °F (36.7 °C) 96 21 143/88 93 %   17 2000 98 °F (36.7 °C) 98 22 (!) 148/94 93 %       Intake/Output Summary (Last 24 hours) at 09/07/17 1637  Last data filed at 09/07/17 1615   Gross per 24 hour   Intake              600 ml   Output              675 ml   Net              -75 ml        PHYSICAL EXAM:  General: WD, WN. Alert, cooperative, in moderate acute distress    EENT:  EOMI. Anicteric sclerae. MMM  Resp:  CTA bilaterally, + wheezing no no rales. No accessory muscle use  CV:  Sinus tachycardia,  No edema  GI:  Soft, Non distended, Non tender.  +Bowel sounds  Neurologic:  Alert and oriented X 3, normal speech,   Psych:   Good insight. anxious   Skin:  No rashes. No jaundice    Reviewed most current lab test results and cultures  YES  Reviewed most current radiology test results   YES  Review and summation of old records today    NO  Reviewed patient's current orders and MAR    YES  PMH/ reviewed - no change compared to H&P  ________________________________________________________________________  Care Plan discussed with:    Comments   Patient x    Family      RN     Care Manager     Consultant                        Multidiciplinary team rounds were held today with , nursing, pharmacist and clinical coordinator. Patient's plan of care was discussed; medications were reviewed and discharge planning was addressed. ________________________________________________________________________  Total NON critical care TIME:  25   Minutes    Total CRITICAL CARE TIME Spent:   Minutes non procedure based      Comments   >50% of visit spent in counseling and coordination of care     ________________________________________________________________________  Dae Cox MD     Procedures: see electronic medical records for all procedures/Xrays and details which were not copied into this note but were reviewed prior to creation of Plan. LABS:  I reviewed today's most current labs and imaging studies.   Pertinent labs include:  Recent Labs      09/06/17   0331   WBC 11. 2   HGB  15.1   HCT  44.0   PLT  231     Recent Labs      09/06/17   0331   NA  136   K  4.2   CL  102   CO2  26   GLU  110*   BUN  27*   CREA  0.81   CA  9.5       Signed: Irma Oconnell MD

## 2017-09-07 NOTE — PROGRESS NOTES
Bedside and Verbal shift change report given to Araceli (oncoming nurse) by Nadja Gaffney (offgoing nurse). Report included the following information SBAR, Kardex, MAR and Recent Results.     SITUATION:  Code Status: Full Code  Reason for Admission: COPD exacerbation ([facility] [address]  Lung infiltrate  Tobacco abuse  Hospital day: 2  Problem List:   [facility] Problems  Never Reviewed             Codes Class Noted POA     Tobacco abuse ICD-10-CM: Z72.0  ICD-9-CM: 845. 1   9/4/2017 Yes           COPD exacerbation ([facility] [address] ICD-10-CM: J44.1  ICD-9-CM: 491.21   9/4/2017 Yes           Lung infiltrate ICD-10-CM: R91.8  ICD-9-CM: 793.19   9/4/2017 Yes           Weight loss ICD-10-CM: R63.4  ICD-9-CM: 783.21   9/4/2017 Yes                  BACKGROUND:                        Past Medical History:        Past Medical History:   Diagnosis Date    Arthritis      Asthma      COPD      Headache syndrome, complicated      Heroin abuse      Hyperlipidemia      Hypertension                              Patient taking anticoagulants yes                         Patient has a defibrillator: no      ASSESSMENT:  Changes in Assessment Throughout Shift:   Patient alert and oriented x's 4. BP's have stabilized throughout the day. On 2 L O2, expiratory wheeze present, nebs given. Percocet given for pain. Eating well. Voiding adequately. No BM. Resting much of the day. Will continue to monitor.       Significant Changes in 24 hours (for example, RR/code, fall)  Patient has Central Line: no  Patient has Chow Cath: no  PT  IV Patency  OR Checklist  Pending Tests     Last Vitals:  Vitals w/ MEWS Score (last day)     Date/Time MEWS Score Pulse Resp Temp BP Level of Consciousness SpO2     09/06/17 1623 3 (!)  113 20 98.8 °F (37.1 °C) (!)  140/93 Alert 96 %     09/06/17 1127 3 (!)  114 20 98 °F (36.7 °C) 126/71 Alert 95 %     09/06/17 1000 -- -- -- -- (!)  156/94 -- --     09/06/17 0820 3 (!)  126 20 98.1 °F (36.7 °C) (!)  171/119 Alert 99 %     09/06/17 0419 2 (!)  107 20 98.2 °F (36.8 °C) (!)  155/108 Alert 96 %     09/06/17 0023 2 (!)  104 20 98.3 °F (36.8 °C) (!)  146/101 Alert 97 %     09/05/17 2028 3 (!)  113 20 98.9 °F (37.2 °C) 125/83 Alert 97 %     09/05/17 1603 3 (!)  119 20 98 °F (36.7 °C) 134/84 Alert 95 %     09/05/17 1157 3 (!)  114 20 98 °F (36.7 °C) 121/77 Alert 95 %     09/05/17 1140 -- -- -- -- -- -- 98 %     09/05/17 0755 3 (!)  114 20 98 °F (36.7 °C) (!)  144/91 Alert 98 %     09/05/17 0445 -- (!)  110 -- -- 124/71 -- --     09/05/17 0311 2 (!)  108 20 98.9 °F (37.2 °C) (!)  167/97 Alert 97 %               PAIN                                              Pain Assessment                                              Pain Intensity 1: 0 (09/06/17 0419)                                              Pain Location 1: Head                                              Pain Intervention(s) 1: Medication (see MAR)                                              Patient Stated Pain Goal: 0  Intervention effective: yes     Last 3 Weights:      Last 3 Recorded Weights in this Encounter     09/04/17 0557   Weight: 55.8 kg (123 lb)   Weight change:      INTAKE/OUPUT                                              Current Shift: 09/06 0701 - 09/06 1900  In: 250 [P.O.:250]  Out: 300 [Urine:300]                                              Last three shifts: 09/04 1901 - 09/06 0700  In: 80 [P.O.:480; I.V.:300]  Out: 1550 [Urine:1550]     RECOMMENDATIONS AND DISCHARGE PLANNING  Patient needs and requests: NA     Pending tests/procedures: NA      Discharge plan for patient:  To be determined      Discharge planning Needs or Barriers: NA     Estimated Discharge Date: TO BE DETERMINED Posted on Whiteboard in Patients Room: no        \"HEALS\" SAFETY CHECK  A safety check occurred in the patient's room between off going nurse and oncoming nurse listed above.     The safety check included the below items:     H  High Alert Medications                      Verify all high alert medication drips (heparin, PCA, etc.)  E  Equipment                 Suction is set up for ALL patients (with vicenta)  Red plugs utilized for all equipment (IV pumps, etc.)  WOWs wiped down at end of shift. Room stocked with oxygen, suction, and other unit-specific supplies  A  Alarms           Bed alarm is set for fall risk patients  Ensure chair alarm is in place and activated if patient is up in a chair  L  Lines              Check IV for any infiltration  Chow bag is empty if patient has a Chow   Tubing and IV bags are labeled  S  Safety  Room is clean, patient is clean, and equipment is clean. Hallways are clear from equipment besides carts. Fall bracelet on for fall risk patients  Ensure room is clear and free of clutter  Suction is set up for ALL patients (with vicenta)  Hallways are clear from equipment besides carts.    Isolation precautions followed, supplies available outside room, sign posted     Suleiman Edwards

## 2017-09-07 NOTE — INTERDISCIPLINARY ROUNDS
IDR/SLIDR Summary          Patient: Marko Anderson MRN: [id number]    Age: 80 y.o. YOB: 1945 Room/Bed: [facility]   Admit Diagnosis: COPD exacerbation (HCC)  Lung infiltrate  Tobacco abuse  Principal Diagnosis: <principal problem not specified>   Goals: oxygenation  Readmission: NO  Quality Measure: COPD  VTE Prophylaxis: Chemical  Influenza Vaccine screening completed? NO  Pneumococcal Vaccine screening completed? NO  Mobility needs: No   Nutrition plan:Yes  Consults:Respiratory    Financial concerns:Yes  Escalated to CM? YES  RRAT Score: 9   Interventions:  Testing due for pt today?  NO  LOS: 3 days Expected length of stay 4 days  Discharge plan: TBD   PCP: Ren Wooten MD  Transportation needs: Yes    Days before discharge:one day until discharge   Discharge disposition: TBD    Signed:     Melecio Kussmaul, RN  9/7/2017  4:32 AM

## 2017-09-08 VITALS
HEART RATE: 95 BPM | DIASTOLIC BLOOD PRESSURE: 89 MMHG | BODY MASS INDEX: 21.79 KG/M2 | TEMPERATURE: 97.1 F | OXYGEN SATURATION: 94 % | WEIGHT: 123 LBS | HEIGHT: 63 IN | SYSTOLIC BLOOD PRESSURE: 152 MMHG | RESPIRATION RATE: 18 BRPM

## 2017-09-08 PROCEDURE — 94640 AIRWAY INHALATION TREATMENT: CPT

## 2017-09-08 PROCEDURE — 74011250637 HC RX REV CODE- 250/637: Performed by: INTERNAL MEDICINE

## 2017-09-08 PROCEDURE — 74011250637 HC RX REV CODE- 250/637: Performed by: FAMILY MEDICINE

## 2017-09-08 PROCEDURE — 74011250636 HC RX REV CODE- 250/636: Performed by: INTERNAL MEDICINE

## 2017-09-08 PROCEDURE — 77010033678 HC OXYGEN DAILY

## 2017-09-08 PROCEDURE — 74011000258 HC RX REV CODE- 258: Performed by: INTERNAL MEDICINE

## 2017-09-08 PROCEDURE — 74011000250 HC RX REV CODE- 250: Performed by: INTERNAL MEDICINE

## 2017-09-08 RX ORDER — AZITHROMYCIN 250 MG/1
500 TABLET, FILM COATED ORAL DAILY
Status: DISCONTINUED | OUTPATIENT
Start: 2017-09-09 | End: 2017-09-08 | Stop reason: HOSPADM

## 2017-09-08 RX ADMIN — SODIUM CHLORIDE 500 MG: 900 INJECTION, SOLUTION INTRAVENOUS at 05:43

## 2017-09-08 RX ADMIN — CEFTRIAXONE 1 G: 1 INJECTION, POWDER, FOR SOLUTION INTRAMUSCULAR; INTRAVENOUS at 04:57

## 2017-09-08 RX ADMIN — HYDROCODONE BITARTRATE AND ACETAMINOPHEN 2 TABLET: 10; 325 TABLET ORAL at 07:47

## 2017-09-08 RX ADMIN — IPRATROPIUM BROMIDE AND ALBUTEROL SULFATE 3 ML: 2.5; .5 SOLUTION RESPIRATORY (INHALATION) at 08:47

## 2017-09-08 RX ADMIN — CLONIDINE HYDROCHLORIDE 0.1 MG: 0.1 TABLET ORAL at 09:25

## 2017-09-08 RX ADMIN — IPRATROPIUM BROMIDE AND ALBUTEROL SULFATE 3 ML: 2.5; .5 SOLUTION RESPIRATORY (INHALATION) at 04:44

## 2017-09-08 RX ADMIN — Medication 10 ML: at 05:02

## 2017-09-08 RX ADMIN — GUAIFENESIN 600 MG: 600 TABLET, EXTENDED RELEASE ORAL at 09:25

## 2017-09-08 RX ADMIN — ENOXAPARIN SODIUM 40 MG: 40 INJECTION SUBCUTANEOUS at 05:05

## 2017-09-08 NOTE — PROGRESS NOTES
Problem: Falls - Risk of  Goal: *Absence of Falls  Document Blake Fall Risk and appropriate interventions in the flowsheet.    Fall Risk Interventions:  Mobility Interventions: Assess mobility with egress test     Mentation Interventions: Adequate sleep, hydration, pain control     Medication Interventions: Patient to call before getting OOB     Elimination Interventions: Call light in reach     History of Falls Interventions: Bed/chair exit alarm

## 2017-09-08 NOTE — PROGRESS NOTES
Patient out of room. No procedures scheduled. Personal cell phone sitting on bedside table. Last seen sleeping in bed. Request a blanket. [place] provided and Medications given. MD, Security, and nursing supervisor notified.

## 2017-09-08 NOTE — PROGRESS NOTES
Problem: Falls - Risk of  Goal: *Absence of Falls  Document Blake Fall Risk and appropriate interventions in the flowsheet.    Outcome: Progressing Towards Goal  Fall Risk Interventions:  Mobility Interventions: Assess mobility with egress test     Mentation Interventions: Adequate sleep, hydration, pain control     Medication Interventions: Patient to call before getting OOB     Elimination Interventions: Call light in reach     History of Falls Interventions: Bed/chair exit alarm

## 2017-09-08 NOTE — PROGRESS NOTES
Patient's IV got infiltrated when he was trying to use the bathroom. This nurse wanted to insert a new IV on patient, but he refused and stated he wanted to eat first. He also voiced that he was in pain level of 10 from a  scale of 0-10. Day shift nurse was made aware.

## 2017-09-08 NOTE — PROGRESS NOTES
Bedside and Verbal shift change report given to Amarilis Sam RN (oncoming nurse) by Bernardo Castrejon (offgoing nurse). Report included the following information SBAR, Kardex, MAR and Recent Results.     SITUATION:  Code Status: Full Code  Reason for Admission: COPD exacerbation ([facility] [address]  Lung infiltrate  Tobacco abuse  Hospital day: 2  Problem List:   [facility] Problems  Never Reviewed             Codes Class Noted POA     Tobacco abuse ICD-10-CM: Z72.0  ICD-9-CM: 954. 1   9/4/2017 Yes           COPD exacerbation ([facility] [address] ICD-10-CM: J44.1  ICD-9-CM: 491.21   9/4/2017 Yes           Lung infiltrate ICD-10-CM: R91.8  ICD-9-CM: 793.19   9/4/2017 Yes           Weight loss ICD-10-CM: R63.4  ICD-9-CM: 783.21   9/4/2017 Yes                  BACKGROUND:                        Past Medical History:        Past Medical History:   Diagnosis Date    Arthritis      Asthma      COPD      Headache syndrome, complicated      Heroin abuse      Hyperlipidemia      Hypertension                              Patient taking anticoagulants yes                         Patient has a defibrillator: no      ASSESSMENT:  Changes in Assessment Throughout Shift:   Patient alert and oriented x's 4. BP's have stabilized throughout the day. On 2 L O2, expiratory wheeze present, nebs given. Percocet given for pain. Eating well. Voiding adequately. No BM. Resting much of the day. Will continue to monitor.       Significant Changes in 24 hours (for example, RR/code, fall)  Patient has Central Line: no  Patient has Chow Cath: no  PT  IV Patency  OR Checklist  Pending Tests     Last Vitals:  Vitals w/ MEWS Score (last day)     Date/Time MEWS Score Pulse Resp Temp BP Level of Consciousness SpO2     09/06/17 1623 3 (!)  113 20 98.8 °F (37.1 °C) (!)  140/93 Alert 96 %     09/06/17 1127 3 (!)  114 20 98 °F (36.7 °C) 126/71 Alert 95 %     09/06/17 1000 -- -- -- -- (!)  156/94 -- --     09/06/17 0820 3 (!)  126 20 98.1 °F (36.7 °C) (!)  171/119 Alert 99 %     09/06/17 0419 2 (!)  107 20 98.2 °F (36.8 °C) (!)  155/108 Alert 96 %     09/06/17 0023 2 (!)  104 20 98.3 °F (36.8 °C) (!)  146/101 Alert 97 %     09/05/17 2028 3 (!)  113 20 98.9 °F (37.2 °C) 125/83 Alert 97 %     09/05/17 1603 3 (!)  119 20 98 °F (36.7 °C) 134/84 Alert 95 %     09/05/17 1157 3 (!)  114 20 98 °F (36.7 °C) 121/77 Alert 95 %     09/05/17 1140 -- -- -- -- -- -- 98 %     09/05/17 0755 3 (!)  114 20 98 °F (36.7 °C) (!)  144/91 Alert 98 %     09/05/17 0445 -- (!)  110 -- -- 124/71 -- --     09/05/17 0311 2 (!)  108 20 98.9 °F (37.2 °C) (!)  167/97 Alert 97 %               PAIN                                              Pain Assessment                                              Pain Intensity 1: 0 (09/06/17 0419)                                              Pain Location 1: Head                                              Pain Intervention(s) 1: Medication (see MAR)                                              Patient Stated Pain Goal: 0  Intervention effective: yes     Last 3 Weights:      Last 3 Recorded Weights in this Encounter     09/04/17 0557   Weight: 55.8 kg (123 lb)   Weight change:      INTAKE/OUPUT                                              Current Shift: 09/06 0701 - 09/06 1900  In: 250 [P.O.:250]  Out: 300 [Urine:300]                                              Last three shifts: 09/04 1901 - 09/06 0700  In: 80 [P.O.:480; I.V.:300]  Out: 1550 [Urine:1550]     RECOMMENDATIONS AND DISCHARGE PLANNING  Patient needs and requests: NA     Pending tests/procedures: NA      Discharge plan for patient:  To be determined      Discharge planning Needs or Barriers: NA     Estimated Discharge Date: TO BE DETERMINED Posted on Whiteboard in Patients Room: no        \"HEALS\" SAFETY CHECK  A safety check occurred in the patient's room between off going nurse and oncoming nurse listed above.     The safety check included the below items:     H  High Alert Medications                      Verify all high alert medication drips (heparin, PCA, etc.)  E  Equipment                 Suction is set up for ALL patients (with vicenta)  Red plugs utilized for all equipment (IV pumps, etc.)  WOWs wiped down at end of shift. Room stocked with oxygen, suction, and other unit-specific supplies  A  Alarms           Bed alarm is set for fall risk patients  Ensure chair alarm is in place and activated if patient is up in a chair  L  Lines              Check IV for any infiltration  Chow bag is empty if patient has a Chow   Tubing and IV bags are labeled  S  Safety  Room is clean, patient is clean, and equipment is clean. Hallways are clear from equipment besides carts. Fall bracelet on for fall risk patients  Ensure room is clear and free of clutter  Suction is set up for ALL patients (with vicenta)  Hallways are clear from equipment besides carts.    Isolation precautions followed, supplies available outside room, sign posted     Katya Brine

## 2017-09-09 NOTE — DISCHARGE SUMMARY
[address]  [address] [place] SUMMARY    Name:  Vanessa Hutson  MR#:  [id number]  :  1945  Account #:  [de-identified]  Date of Adm:  2017  Date of Discharge:  2017      I am dictating this discharge summary as patient left the hospital  without telling anyone. I have never seen this patient in my life. I was  supposed to see him today, but around 11 o'clock I was told by nurses  that they could not find patient in the room. They had called Security,  as well as Nursing Supervisor and now they are going to remove this  patient from the list as patient has been off the floor for more than an  hour. I am just doing dictation to help the hospital out, but as I said I  have never been involved in the clinical care of this patient. Upon review of chart it looks like patient was admitted here for acute  exacerbation COPD and bronchitis management. He was also using  heroin and tobacco. He was clinically managed with steroid, nebulizer  and antibiotic. Dr. Jodee Moon from hospitalist department had seen this  patient on 2017. The patient was appropriately treated  here. As per nurses, when patient was seen by them last time he was alert,  oriented x3. The patient's daughter is aware of patient leaving the  premises. I have told nurses to follow the hospital policy after talking to  Nursing Supervisor. They will follow the hospital policy to find the  patient, and if patient is found then they need to bring him through  emergency room to be evaluated, whether he needs re-admit or he  can be discharged with p.o. steroid, p.o. antibiotic and outpatient  methadone. I have confirmed with the patient's nurse that patient did  not leave the hospital with any IV line.         MD JASPAL Robison / Michael Azar  D:  2017   14:20  T:  2017   21:34  Job #:  [id number]

## 2017-12-08 ENCOUNTER — APPOINTMENT (OUTPATIENT)
Dept: GENERAL RADIOLOGY | Age: 72
End: 2017-12-08
Attending: EMERGENCY MEDICINE
Payer: MEDICARE

## 2017-12-08 ENCOUNTER — HOSPITAL ENCOUNTER (EMERGENCY)
Age: 72
Discharge: HOME | End: 2017-12-08
Attending: EMERGENCY MEDICINE
Payer: MEDICARE

## 2017-12-08 VITALS
RESPIRATION RATE: 23 BRPM | DIASTOLIC BLOOD PRESSURE: 98 MMHG | SYSTOLIC BLOOD PRESSURE: 142 MMHG | OXYGEN SATURATION: 94 % | TEMPERATURE: 97.9 F | HEART RATE: 93 BPM

## 2017-12-08 DIAGNOSIS — T40.1X1A ACCIDENTAL OVERDOSE OF HEROIN, INITIAL ENCOUNTER (HCC): Primary | ICD-10-CM

## 2017-12-08 LAB
ANION GAP SERPL CALC-SCNC: 7 MMOL/L (ref 3–18)
BASOPHILS # BLD: 0 K/UL (ref 0–0.06)
BASOPHILS NFR BLD: 0 % (ref 0–2)
BUN SERPL-MCNC: 14 MG/DL (ref 7–18)
BUN/CREAT SERPL: 15 (ref 12–20)
CALCIUM SERPL-MCNC: 9.9 MG/DL (ref 8.5–10.1)
CHLORIDE SERPL-SCNC: 106 MMOL/L (ref 100–108)
CO2 SERPL-SCNC: 27 MMOL/L (ref 21–32)
CREAT SERPL-MCNC: 0.91 MG/DL (ref 0.6–1.3)
DIFFERENTIAL METHOD BLD: ABNORMAL
EOSINOPHIL # BLD: 0.2 K/UL (ref 0–0.4)
EOSINOPHIL NFR BLD: 2 % (ref 0–5)
ERYTHROCYTE [DISTWIDTH] IN BLOOD BY AUTOMATED COUNT: 15.9 % (ref 11.6–14.5)
GLUCOSE SERPL-MCNC: 198 MG/DL (ref 74–99)
HCT VFR BLD AUTO: 42.1 % (ref 36–48)
HGB BLD-MCNC: 13.7 G/DL (ref 13–16)
LYMPHOCYTES # BLD: 3.1 K/UL (ref 0.9–3.6)
LYMPHOCYTES NFR BLD: 32 % (ref 21–52)
MCH RBC QN AUTO: 28.6 PG (ref 24–34)
MCHC RBC AUTO-ENTMCNC: 32.5 G/DL (ref 31–37)
MCV RBC AUTO: 87.9 FL (ref 74–97)
MONOCYTES # BLD: 0.8 K/UL (ref 0.05–1.2)
MONOCYTES NFR BLD: 8 % (ref 3–10)
NEUTS SEG # BLD: 5.6 K/UL (ref 1.8–8)
NEUTS SEG NFR BLD: 58 % (ref 40–73)
PLATELET # BLD AUTO: 181 K/UL (ref 135–420)
PMV BLD AUTO: 9.2 FL (ref 9.2–11.8)
POTASSIUM SERPL-SCNC: 4.5 MMOL/L (ref 3.5–5.5)
RBC # BLD AUTO: 4.79 M/UL (ref 4.7–5.5)
SODIUM SERPL-SCNC: 140 MMOL/L (ref 136–145)
WBC # BLD AUTO: 9.7 K/UL (ref 4.6–13.2)

## 2017-12-08 PROCEDURE — 94640 AIRWAY INHALATION TREATMENT: CPT

## 2017-12-08 PROCEDURE — 74011000250 HC RX REV CODE- 250

## 2017-12-08 PROCEDURE — 74011250636 HC RX REV CODE- 250/636: Performed by: EMERGENCY MEDICINE

## 2017-12-08 PROCEDURE — 99283 EMERGENCY DEPT VISIT LOW MDM: CPT

## 2017-12-08 PROCEDURE — 80048 BASIC METABOLIC PNL TOTAL CA: CPT

## 2017-12-08 PROCEDURE — 71010 XR CHEST SNGL V: CPT

## 2017-12-08 PROCEDURE — 85025 COMPLETE CBC W/AUTO DIFF WBC: CPT

## 2017-12-08 PROCEDURE — 77030029684 HC NEB SM VOL KT MONA -A

## 2017-12-08 PROCEDURE — 96365 THER/PROPH/DIAG IV INF INIT: CPT

## 2017-12-08 RX ORDER — ALBUTEROL SULFATE 0.83 MG/ML
2.5 SOLUTION RESPIRATORY (INHALATION)
Status: COMPLETED | OUTPATIENT
Start: 2017-12-08 | End: 2017-12-08

## 2017-12-08 RX ORDER — IPRATROPIUM BROMIDE AND ALBUTEROL SULFATE 2.5; .5 MG/3ML; MG/3ML
SOLUTION RESPIRATORY (INHALATION)
Status: COMPLETED
Start: 2017-12-08 | End: 2017-12-08

## 2017-12-08 RX ORDER — MAGNESIUM SULFATE HEPTAHYDRATE 40 MG/ML
2 INJECTION, SOLUTION INTRAVENOUS ONCE
Status: COMPLETED | OUTPATIENT
Start: 2017-12-08 | End: 2017-12-08

## 2017-12-08 RX ORDER — ALBUTEROL SULFATE 0.83 MG/ML
SOLUTION RESPIRATORY (INHALATION)
Status: COMPLETED
Start: 2017-12-08 | End: 2017-12-08

## 2017-12-08 RX ORDER — IPRATROPIUM BROMIDE AND ALBUTEROL SULFATE 2.5; .5 MG/3ML; MG/3ML
3 SOLUTION RESPIRATORY (INHALATION)
Status: COMPLETED | OUTPATIENT
Start: 2017-12-08 | End: 2017-12-08

## 2017-12-08 RX ADMIN — ALBUTEROL SULFATE 2.5 MG: 2.5 SOLUTION RESPIRATORY (INHALATION) at 17:46

## 2017-12-08 RX ADMIN — IPRATROPIUM BROMIDE AND ALBUTEROL SULFATE 3 ML: .5; 3 SOLUTION RESPIRATORY (INHALATION) at 17:46

## 2017-12-08 RX ADMIN — ALBUTEROL SULFATE 2.5 MG: 0.83 SOLUTION RESPIRATORY (INHALATION) at 17:46

## 2017-12-08 RX ADMIN — MAGNESIUM SULFATE HEPTAHYDRATE 2 G: 40 INJECTION, SOLUTION INTRAVENOUS at 17:46

## 2017-12-08 RX ADMIN — IPRATROPIUM BROMIDE AND ALBUTEROL SULFATE 3 ML: 2.5; .5 SOLUTION RESPIRATORY (INHALATION) at 17:46

## 2017-12-08 NOTE — ED TRIAGE NOTES
[facility]  There for rehab- broken lab  Snorted 1.5 caps of heroin  Found unresponsive- breathing 6/min  Ems bagged pt and administered 2mg narcan intranasally   Pt A&O X4 audible wheezing and in respiratory distress

## 2017-12-08 NOTE — DISCHARGE INSTRUCTIONS
Learning About Naloxone for Opioid Overdose  What is naloxone? Opioids are strong pain medicines. Examples include hydrocodone, oxycodone, fentanyl, and morphine. Heroin is an example of an illegal opioid. Taking too much of an opioid can cause death. An overdose is an emergency. Naloxone is a medicine that reverses the effects of an overdose. If you take it soon enough after an overdose, it can save your life. Naloxone comes in a rescue kit you can carry with you. You may hear it called a Narcan kit for short. The rescue kit may contain:  · The medicine. · Syringes and needles. · A nasal adapter for the syringes. · A separate nasal spray device. Your doctor can give you a prescription for a rescue kit and show you how to use it. In some places you can buy Narcan kits without a prescription. When is naloxone used? Naloxone is used when a person shows signs of an opioid overdose. A person may have overdosed if he or she is:  · Sleepy or hard to wake up. · Confused. · Not breathing normally. Make sure your family and friends know about these signs of an overdose. If someone appears to have overdosed, call 911. A drug overdose is an emergency. How do you use it? If you overdose, you may not be able to give yourself the medicine. So it's very important to teach friends and family how and when to give it to you. Rescue kits come with instructions. There are two ways to give the medicine. Many rescue kits can be used for either method. The methods are:  · Injection with needle and syringe. ¨ Training may be needed in order to use this method correctly. ¨ Some rescue kits come with automatic injectors that don't require special training. ¨ The medicine can be injected through clothing. · Nasal spray. ¨ Many rescue kits come with a nasal adapter. It attaches to the syringe and turns the medicine into a mist.  ¨ The mist is sprayed into the nose of a person who has overdosed.  The person needs to be lying down when the mist is sprayed. Overdose symptoms may return a few minutes after the first dose from the rescue kit. If that happens, a second dose is needed. Rescue kits come with two doses for that reason. Keep your rescue kit with you always. You never know when you might need it. If you think you or someone else may have overdosed but you're not sure, use the kit anyway. Aside from going through withdrawal, which may be uncomfortable, there is no downside to using this medicine. Always go to the emergency room after using naloxone. Doctors will want to make sure the overdose has been reversed. Follow-up care is a key part of your treatment and safety. Be sure to make and go to all appointments, and call your doctor if you are having problems. It's also a good idea to know your test results and keep a list of the medicines you take. Where can you learn more? Go to http://marcia-maranda.info/. Enter T757 in the search box to learn more about \"Learning About Naloxone for Opioid Overdose. \"  Current as of: November 3, 2016  Content Version: 11.4  © 2952-7404 [employer]. Care instructions adapted under license by [employer] (which disclaims liability or warranty for this information). If you have questions about a medical condition or this instruction, always ask your healthcare professional. [address] any warranty or liability for your use of this information.

## 2017-12-08 NOTE — ED PROVIDER NOTES
EMERGENCY DEPARTMENT HISTORY AND PHYSICAL EXAM    5:33 PM      Date: 12/8/2017  Patient Name: Oxana Hoskins    History of Presenting Illness     Chief Complaint   Patient presents with    Drug Overdose         History Provided By: Patient and EMS    Chief Complaint: Overdose  Duration: 2 Hours  Timing:  Constant  Associated Symptoms: SOB. Patient denies any other associated signs or symptoms      Additional History (Context): Oxana Hoskins is a 71 y.o. male with a history of COPD, HTN, asthma, HLD, and heroin abuse who presents with c/o heroin overdose onset PTA. Associated symptoms include SOB. Patient states that he snorted heroin and then the next thing he remembers is that he was in the back of an ambulance. EMS personnel state that they picked up patient from [place], where he was for rehab. EMS state that he snorted 1.5 caps of heroin and was found unresponsive, breathing at 6/min. Per EMS, patient was bagged valve masked and given narcan intranasally with improvement. Patient denies any other symptoms or complaints. PCP: Svetlana Ayala MD    Current Outpatient Prescriptions   Medication Sig Dispense Refill    GUAIFENESIN (MUCINEX PO) Take  by mouth.  albuterol (PROVENTIL HFA, VENTOLIN HFA, PROAIR HFA) 90 mcg/actuation inhaler Take 1-2 Puffs by inhalation every four (4) hours as needed for Wheezing. 1 Inhaler 0    predniSONE (STERAPRED DS) 10 mg dose pack Use as directed, start on 9/10/16 21 Tab 0    fluticasone-salmeterol (ADVAIR DISKUS) 250-50 mcg/dose diskus inhaler Take 1 Puff by inhalation every twelve (12) hours. 1 Inhaler 0    Nebulizer & Compressor machine 1 Each by Does Not Apply route daily as needed (wheezing).  1 Each 0       Past History     Past Medical History:  Past Medical History:   Diagnosis Date    Arthritis     Asthma     COPD     Headache syndrome, complicated     Heroin abuse     Hyperlipidemia     Hypertension        Past Surgical History:  Past Surgical History: Procedure Laterality Date    HX APPENDECTOMY  hemorrhoid    HX CATARACT REMOVAL         Family History:  Family History   Problem Relation Age of Onset    Diabetes Mother     Hypertension Father        Social History:  Social History   Substance Use Topics    Smoking status: Current Every Day Smoker     Packs/day: 0.50    Smokeless tobacco: Never Used    Alcohol use Yes      Comment: occasional       Allergies:  No Known Allergies      Review of Systems       Review of Systems   Constitutional: Negative for chills and fever. Respiratory: Positive for shortness of breath. Cardiovascular: Negative for chest pain. Gastrointestinal: Negative for diarrhea, nausea and vomiting. Neurological: Negative for headaches. All other systems reviewed and are negative. Physical Exam   There were no vitals taken for this visit. Physical Exam   Constitutional: He is oriented to person, place, and time. He appears well-developed and well-nourished. No distress. HENT:   Head: Normocephalic and atraumatic. Eyes: Conjunctivae and EOM are normal. Right eye exhibits no discharge. Left eye exhibits no discharge. No scleral icterus. Neck: Normal range of motion. Neck supple. No tracheal deviation present. Cardiovascular: Normal rate, regular rhythm and normal heart sounds. No murmur heard. Pulmonary/Chest: Effort normal and breath sounds normal. No respiratory distress. He has no wheezes. He has no rales. Abdominal: Soft. He exhibits no distension. There is no tenderness. There is no rebound and no guarding. Musculoskeletal: Normal range of motion. He exhibits no edema or deformity. Neurological: He is alert and oriented to person, place, and time. No cranial nerve deficit. Skin: Skin is warm and dry. He is not diaphoretic. Psychiatric: He has a normal mood and affect.  His behavior is normal. Judgment and thought content normal.         Diagnostic Study Results     Labs -  Recent Results (from the past 12 hour(s))   CBC WITH AUTOMATED DIFF    Collection Time: 12/08/17  5:48 PM   Result Value Ref Range    WBC 9.7 4.6 - 13.2 K/uL    RBC 4.79 4.70 - 5.50 M/uL    HGB 13.7 13.0 - 16.0 g/dL    HCT 42.1 36.0 - 48.0 %    MCV 87.9 74.0 - 97.0 FL    MCH 28.6 24.0 - 34.0 PG    MCHC 32.5 31.0 - 37.0 g/dL    RDW 15.9 (H) 11.6 - 14.5 %    PLATELET 524 592 - 398 K/uL    MPV 9.2 9.2 - 11.8 FL    NEUTROPHILS 58 40 - 73 %    LYMPHOCYTES 32 21 - 52 %    MONOCYTES 8 3 - 10 %    EOSINOPHILS 2 0 - 5 %    BASOPHILS 0 0 - 2 %    ABS. NEUTROPHILS 5.6 1.8 - 8.0 K/UL    ABS. LYMPHOCYTES 3.1 0.9 - 3.6 K/UL    ABS. MONOCYTES 0.8 0.05 - 1.2 K/UL    ABS. EOSINOPHILS 0.2 0.0 - 0.4 K/UL    ABS. BASOPHILS 0.0 0.0 - 0.06 K/UL    DF AUTOMATED     METABOLIC PANEL, BASIC    Collection Time: 12/08/17  5:48 PM   Result Value Ref Range    Sodium 140 136 - 145 mmol/L    Potassium 4.5 3.5 - 5.5 mmol/L    Chloride 106 100 - 108 mmol/L    CO2 27 21 - 32 mmol/L    Anion gap 7 3.0 - 18 mmol/L    Glucose 198 (H) 74 - 99 mg/dL    BUN 14 7.0 - 18 MG/DL    Creatinine 0.91 0.6 - 1.3 MG/DL    BUN/Creatinine ratio 15 12 - 20      GFR est AA >60 >60 ml/min/1.73m2    GFR est non-AA >60 >60 ml/min/1.73m2    Calcium 9.9 8.5 - 10.1 MG/DL       Radiologic Studies -   XR CHEST SNGL V    (Results Pending)   No results found. Medical Decision Making   I am the first provider for this patient. I reviewed the vital signs, available nursing notes, past medical history, past surgical history, family history and social history. Vital Signs-Reviewed the patient's vital signs. Provider Notes (Medical Decision Making): Discussed drug rehab with patient extensively. Pt wants to quit using heroin, discussed NA meetings and [facility]. Pt will call [facility] Monday morning. Diagnosis     Clinical Impression:   1.  Accidental overdose of heroin, initial encounter        Disposition: home    Follow-up Information     Follow up With Details Comments Contact Info Svetlana Ayala MD   [address] [facility] [address]  [phone]      [facility] EMERGENCY DEPT   [address]  [phone]           Patient's Medications   Start Taking    No medications on file   Continue Taking    ALBUTEROL (PROVENTIL HFA, VENTOLIN HFA, PROAIR HFA) 90 MCG/ACTUATION INHALER    Take 1-2 Puffs by inhalation every four (4) hours as needed for Wheezing. FLUTICASONE-SALMETEROL (ADVAIR DISKUS) 250-50 MCG/DOSE DISKUS INHALER    Take 1 Puff by inhalation every twelve (12) hours. GUAIFENESIN (MUCINEX PO)    Take  by mouth. NEBULIZER & COMPRESSOR MACHINE    1 Each by Does Not Apply route daily as needed (wheezing). PREDNISONE (STERAPRED DS) 10 MG DOSE PACK    Use as directed, start on 9/10/16   These Medications have changed    No medications on file   Stop Taking    No medications on file     _______________________________    Attestations:  Haydenibe [employer] acting as a scribe for and in the presence of Rober Weeks MD      December 08, 2017 at 5:33 PM       Provider Attestation:      I personally performed the services described in the documentation, reviewed the documentation, as recorded by the scribe in my presence, and it accurately and completely records my words and actions.  December 08, 2017 at 5:33 PM - Rober Weeks MD    _______________________________

## 2017-12-08 NOTE — ED NOTES
Report given to Coco Wen at [facility], [facility].

## 2018-01-16 ENCOUNTER — HOSPITAL ENCOUNTER (EMERGENCY)
Age: 73
Discharge: HOME | End: 2018-01-16
Attending: EMERGENCY MEDICINE
Payer: MEDICARE

## 2018-01-16 ENCOUNTER — APPOINTMENT (OUTPATIENT)
Dept: GENERAL RADIOLOGY | Age: 73
End: 2018-01-16
Attending: EMERGENCY MEDICINE
Payer: MEDICARE

## 2018-01-16 VITALS
DIASTOLIC BLOOD PRESSURE: 93 MMHG | HEIGHT: 63 IN | TEMPERATURE: 98.5 F | OXYGEN SATURATION: 93 % | BODY MASS INDEX: 22.15 KG/M2 | WEIGHT: 125 LBS | RESPIRATION RATE: 16 BRPM | SYSTOLIC BLOOD PRESSURE: 131 MMHG | HEART RATE: 89 BPM

## 2018-01-16 DIAGNOSIS — J44.1 ACUTE EXACERBATION OF CHRONIC OBSTRUCTIVE PULMONARY DISEASE (COPD) (HCC): Primary | ICD-10-CM

## 2018-01-16 PROCEDURE — 74011636637 HC RX REV CODE- 636/637: Performed by: EMERGENCY MEDICINE

## 2018-01-16 PROCEDURE — 71045 X-RAY EXAM CHEST 1 VIEW: CPT

## 2018-01-16 PROCEDURE — 77030029684 HC NEB SM VOL KT MONA -A

## 2018-01-16 PROCEDURE — 99284 EMERGENCY DEPT VISIT MOD MDM: CPT

## 2018-01-16 PROCEDURE — A9270 NON-COVERED ITEM OR SERVICE: HCPCS | Performed by: EMERGENCY MEDICINE

## 2018-01-16 PROCEDURE — 94640 AIRWAY INHALATION TREATMENT: CPT

## 2018-01-16 PROCEDURE — 93005 ELECTROCARDIOGRAM TRACING: CPT

## 2018-01-16 PROCEDURE — 74011000250 HC RX REV CODE- 250: Performed by: EMERGENCY MEDICINE

## 2018-01-16 RX ORDER — PREDNISONE 20 MG/1
60 TABLET ORAL
Status: COMPLETED | OUTPATIENT
Start: 2018-01-16 | End: 2018-01-16

## 2018-01-16 RX ORDER — PREDNISONE 50 MG/1
50 TABLET ORAL DAILY
Qty: 3 TAB | Refills: 0 | Status: SHIPPED | OUTPATIENT
Start: 2018-01-16 | End: 2018-01-19

## 2018-01-16 RX ORDER — ALBUTEROL SULFATE 0.83 MG/ML
2.5 SOLUTION RESPIRATORY (INHALATION)
Status: COMPLETED | OUTPATIENT
Start: 2018-01-16 | End: 2018-01-16

## 2018-01-16 RX ADMIN — PREDNISONE 60 MG: 20 TABLET ORAL at 19:21

## 2018-01-16 RX ADMIN — ALBUTEROL SULFATE 2.5 MG: 2.5 SOLUTION RESPIRATORY (INHALATION) at 19:21

## 2018-01-16 NOTE — ED PROVIDER NOTES
EMERGENCY DEPARTMENT HISTORY AND PHYSICAL EXAM    5:39 PM      Date: 1/16/2018  Patient Name: Omid Kaur    History of Presenting Illness     Chief Complaint   Patient presents with    Shortness of Breath         History Provided By: Patient    Additional History (Context): Omid Kaur is a 71 y.o. male with arthritis, HTN, asthma, HLD and COPD who presents to the ED with a chief complaint of constant SOB since the last 10 hours with associated symptoms of chest pain. Patient states that he has a history of COPD and had SOB all day today. Patient also states he has chest pain along with SOB. Patient does not state any alleviating or precipitating factors. Patient denies any other symptoms or complaints. PCP: Kaleb Lang MD    Chief Complaint: SOB   Duration: 10 Hours  Timing:  Constant  Location: Chest   Quality: N/A  Severity: Moderate  Modifying Factors: None  Associated Symptoms: chest pain      Current Outpatient Prescriptions   Medication Sig Dispense Refill    GUAIFENESIN (MUCINEX PO) Take  by mouth.  albuterol (PROVENTIL HFA, VENTOLIN HFA, PROAIR HFA) 90 mcg/actuation inhaler Take 1-2 Puffs by inhalation every four (4) hours as needed for Wheezing. 1 Inhaler 0    predniSONE (STERAPRED DS) 10 mg dose pack Use as directed, start on 9/10/16 21 Tab 0    fluticasone-salmeterol (ADVAIR DISKUS) 250-50 mcg/dose diskus inhaler Take 1 Puff by inhalation every twelve (12) hours. 1 Inhaler 0    Nebulizer & Compressor machine 1 Each by Does Not Apply route daily as needed (wheezing).  1 Each 0       Past History     Past Medical History:  Past Medical History:   Diagnosis Date    Arthritis     Asthma     COPD     Headache syndrome, complicated     Heroin abuse     Hyperlipidemia     Hypertension        Past Surgical History:  Past Surgical History:   Procedure Laterality Date    HX APPENDECTOMY  hemorrhoid    HX CATARACT REMOVAL         Family History:  Family History   Problem Relation Age of Onset    Diabetes Mother     Hypertension Father        Social History:  Social History   Substance Use Topics    Smoking status: Current Every Day Smoker     Packs/day: 0.50    Smokeless tobacco: Never Used    Alcohol use Yes      Comment: occasional       Allergies:  No Known Allergies      Review of Systems     Review of Systems   Constitutional: Negative for diaphoresis. Respiratory: Positive for cough and chest tightness. All other systems reviewed and are negative. Physical Exam     Visit Vitals    BP (!) 131/93 (BP 1 Location: Right arm, BP Patient Position: At rest)    Pulse 89    Temp 98.5 °F (36.9 °C)    Resp 16    Ht 5' 3\" (1.6 m)    Wt 56.7 kg (125 lb)    SpO2 93%    BMI 22.14 kg/m2       Physical Exam   Constitutional: He is oriented to person, place, and time. He appears well-developed. HENT:   Head: Normocephalic and atraumatic. Eyes: EOM are normal. Pupils are equal, round, and reactive to light. Neck: Normal range of motion. Neck supple. Cardiovascular: Normal rate, regular rhythm and normal heart sounds. Exam reveals no friction rub. No murmur heard. Pulmonary/Chest: Effort normal and breath sounds normal. No respiratory distress. He has no wheezes. B/l exp wheeze. moderate   Abdominal: Soft. He exhibits no distension. There is no tenderness. There is no rebound and no guarding. Musculoskeletal: Normal range of motion. Neurological: He is alert and oriented to person, place, and time. Skin: Skin is warm and dry. Psychiatric: He has a normal mood and affect. His behavior is normal. Thought content normal.         Diagnostic Study Results   cxr napd  EKG nsr at 83; nl axis. Nl int no abdifatah/d no hypertrophy       Medical Decision Making   1 . Sob - acute copd; likley to go home once meds kick on     7:07 PM feel better will d/c    Diagnosis     No diagnosis found.     _______________________________    Attestations:  Reinaldo Attestation     Amanda Damari acting as a scribe for and in the presence of Brittany Hutson MD      January 16, 2018 at 5:39 PM       Provider Attestation:      I personally performed the services described in the documentation, reviewed the documentation, as recorded by the scribe in my presence, and it accurately and completely records my words and actions.  January 16, 2018 at 5:39 PM - Brittany Hutsno MD    _______________________________

## 2018-01-17 NOTE — DISCHARGE INSTRUCTIONS
Chronic Obstructive Pulmonary Disease (COPD): Care Instructions  Your Care Instructions    Chronic obstructive pulmonary disease (COPD) is a general term for a group of lung diseases, including emphysema and chronic bronchitis. People with COPD have decreased airflow in and out of the lungs, which makes it hard to breathe. The airways also can get clogged with thick mucus. Cigarette smoking is a major cause of COPD. Although there is no cure for COPD, you can slow its progress. Following your treatment plan and taking care of yourself can help you feel better and live longer. Follow-up care is a key part of your treatment and safety. Be sure to make and go to all appointments, and call your doctor if you are having problems. It's also a good idea to know your test results and keep a list of the medicines you take. How can you care for yourself at home? ?Staying healthy  ? · Do not smoke. This is the most important step you can take to prevent more damage to your lungs. If you need help quitting, talk to your doctor about stop-smoking programs and medicines. These can increase your chances of quitting for good. ? · Avoid colds and flu. Get a pneumococcal vaccine shot. If you have had one before, ask your doctor whether you need a second dose. Get the flu vaccine every fall. If you must be around people with colds or the flu, wash your hands often. ? · Avoid secondhand smoke, air pollution, and high altitudes. Also avoid cold, dry air and hot, humid air. Stay at home with your windows closed when air pollution is bad. ?Medicines and oxygen therapy  ? · Take your medicines exactly as prescribed. Call your doctor if you think you are having a problem with your medicine. ? · You may be taking medicines such as:  ¨ Bronchodilators. These help open your airways and make breathing easier. Bronchodilators are either short-acting (work for 6 to 9 hours) or long-acting (work for 24 hours).  You inhale most bronchodilators, so they start to act quickly. Always carry your quick-relief inhaler with you in case you need it while you are away from home. ¨ Corticosteroids (prednisone, budesonide). These reduce airway inflammation. They come in pill or inhaled form. You must take these medicines every day for them to work well. ? · A spacer may help you get more inhaled medicine to your lungs. Ask your doctor or pharmacist if a spacer is right for you. If it is, ask how to use it properly. ? · Do not take any vitamins, over-the-counter medicine, or herbal products without talking to your doctor first.   ? · If your doctor prescribed antibiotics, take them as directed. Do not stop taking them just because you feel better. You need to take the full course of antibiotics. ? · Oxygen therapy boosts the amount of oxygen in your blood and helps you breathe easier. Use the flow rate your doctor has recommended, and do not change it without talking to your doctor first.   Activity  ? · Get regular exercise. Walking is an easy way to get exercise. Start out slowly, and walk a little more each day. ? · Pay attention to your breathing. You are exercising too hard if you cannot talk while you are exercising. ? · Take short rest breaks when doing household chores and other activities. ? · Learn breathing methods-such as breathing through pursed lips-to help you become less short of breath. ? · If your doctor has not set you up with a pulmonary rehabilitation program, talk to him or her about whether rehab is right for you. Rehab includes exercise programs, education about your disease and how to manage it, help with diet and other changes, and emotional support. Diet  ? · Eat regular, healthy meals. Use bronchodilators about 1 hour before you eat to make it easier to eat. Eat several small meals instead of three large ones. Drink beverages at the end of the meal. Avoid foods that are hard to chew.    ? · Eat foods that contain protein so that you do not lose muscle mass. ? · Talk with your doctor if you gain too much weight or if you lose weight without trying. ?Mental health  ? · Talk to your family, friends, or a therapist about your feelings. It is normal to feel frightened, angry, hopeless, helpless, and even guilty. Talking openly about bad feelings can help you cope. If these feelings last, talk to your doctor. When should you call for help? Call 911 anytime you think you may need emergency care. For example, call if:  ? · You have severe trouble breathing. ?Call your doctor now or seek immediate medical care if:  ? · You have new or worse trouble breathing. ? · You cough up blood. ? · You have a fever. ? Watch closely for changes in your health, and be sure to contact your doctor if:  ? · You cough more deeply or more often, especially if you notice more mucus or a change in the color of your mucus. ? · You have new or worse swelling in your legs or belly. ? · You are not getting better as expected. Where can you learn more? Go to http://marcia-maranda.info/. Ni Young in the search box to learn more about \"Chronic Obstructive Pulmonary Disease (COPD): Care Instructions. \"  Current as of: May 12, 2017  Content Version: 11.4  © 4522-2336 [employer]. Care instructions adapted under license by [employer] (which disclaims liability or warranty for this information). If you have questions about a medical condition or this instruction, always ask your healthcare professional. [address] any warranty or liability for your use of this information.

## 2018-01-18 LAB
ATRIAL RATE: 83 BPM
CALCULATED P AXIS, ECG09: 77 DEGREES
CALCULATED R AXIS, ECG10: 70 DEGREES
CALCULATED T AXIS, ECG11: 68 DEGREES
DIAGNOSIS, 93000: NORMAL
P-R INTERVAL, ECG05: 168 MS
Q-T INTERVAL, ECG07: 362 MS
QRS DURATION, ECG06: 84 MS
QTC CALCULATION (BEZET), ECG08: 425 MS
VENTRICULAR RATE, ECG03: 83 BPM